# Patient Record
Sex: FEMALE | Race: WHITE | NOT HISPANIC OR LATINO | Employment: FULL TIME | URBAN - METROPOLITAN AREA
[De-identification: names, ages, dates, MRNs, and addresses within clinical notes are randomized per-mention and may not be internally consistent; named-entity substitution may affect disease eponyms.]

---

## 2017-02-03 ENCOUNTER — OFFICE VISIT - HEALTHEAST (OUTPATIENT)
Dept: FAMILY MEDICINE | Facility: CLINIC | Age: 32
End: 2017-02-03

## 2017-02-03 DIAGNOSIS — Z00.00 ROUTINE ADULT HEALTH MAINTENANCE: ICD-10-CM

## 2017-02-03 DIAGNOSIS — Z30.44 ENCOUNTER FOR SURVEILLANCE OF VAGINAL RING HORMONAL CONTRACEPTIVE DEVICE: ICD-10-CM

## 2017-02-03 DIAGNOSIS — Z13.6 SCREENING FOR CARDIOVASCULAR CONDITION: ICD-10-CM

## 2017-02-03 DIAGNOSIS — N89.8 VAGINAL DISCHARGE: ICD-10-CM

## 2017-02-03 DIAGNOSIS — G47.00 INSOMNIA, UNSPECIFIED: ICD-10-CM

## 2017-02-03 DIAGNOSIS — F41.9 ANXIETY: ICD-10-CM

## 2017-02-03 DIAGNOSIS — Z13.1 SCREENING FOR DIABETES MELLITUS: ICD-10-CM

## 2017-02-03 DIAGNOSIS — Z12.4 PAP SMEAR FOR CERVICAL CANCER SCREENING: ICD-10-CM

## 2017-02-03 LAB
CHOLEST SERPL-MCNC: 210 MG/DL
FASTING STATUS PATIENT QL REPORTED: YES
HDLC SERPL-MCNC: 43 MG/DL
LDLC SERPL CALC-MCNC: 117 MG/DL
TRIGL SERPL-MCNC: 251 MG/DL

## 2017-02-03 ASSESSMENT — MIFFLIN-ST. JEOR: SCORE: 1566.61

## 2017-02-09 LAB
HPV INTERPRETATION - HISTORICAL: NORMAL
HPV INTERPRETER - HISTORICAL: NORMAL

## 2017-02-13 LAB
BKR LAB AP ABNORMAL BLEEDING: NO
BKR LAB AP BIRTH CONTROL/HORMONES: NORMAL
BKR LAB AP CERVICAL APPEARANCE: NORMAL
BKR LAB AP GYN ADEQUACY: NORMAL
BKR LAB AP GYN INTERPRETATION: NORMAL
BKR LAB AP HPV REFLEX: NORMAL
BKR LAB AP LMP: NORMAL
BKR LAB AP PATIENT STATUS: NORMAL
BKR LAB AP PREVIOUS ABNORMAL: NORMAL
BKR LAB AP PREVIOUS NORMAL: 2013
HIGH RISK?: NO
PATH REPORT.COMMENTS IMP SPEC: NORMAL
RESULT FLAG (HE HISTORICAL CONVERSION): NORMAL

## 2017-02-28 ENCOUNTER — COMMUNICATION - HEALTHEAST (OUTPATIENT)
Dept: SCHEDULING | Facility: CLINIC | Age: 32
End: 2017-02-28

## 2017-09-18 ENCOUNTER — OFFICE VISIT - HEALTHEAST (OUTPATIENT)
Dept: FAMILY MEDICINE | Facility: CLINIC | Age: 32
End: 2017-09-18

## 2017-09-18 DIAGNOSIS — M79.662 PAIN OF LEFT CALF: ICD-10-CM

## 2017-09-18 ASSESSMENT — MIFFLIN-ST. JEOR: SCORE: 1584.91

## 2017-10-16 ENCOUNTER — COMMUNICATION - HEALTHEAST (OUTPATIENT)
Dept: FAMILY MEDICINE | Facility: CLINIC | Age: 32
End: 2017-10-16

## 2017-11-01 ENCOUNTER — COMMUNICATION - HEALTHEAST (OUTPATIENT)
Dept: FAMILY MEDICINE | Facility: CLINIC | Age: 32
End: 2017-11-01

## 2018-02-23 ENCOUNTER — COMMUNICATION - HEALTHEAST (OUTPATIENT)
Dept: FAMILY MEDICINE | Facility: CLINIC | Age: 33
End: 2018-02-23

## 2018-02-23 DIAGNOSIS — Z30.44 ENCOUNTER FOR SURVEILLANCE OF VAGINAL RING HORMONAL CONTRACEPTIVE DEVICE: ICD-10-CM

## 2018-05-22 ENCOUNTER — COMMUNICATION - HEALTHEAST (OUTPATIENT)
Dept: FAMILY MEDICINE | Facility: CLINIC | Age: 33
End: 2018-05-22

## 2018-05-22 DIAGNOSIS — Z30.44 ENCOUNTER FOR SURVEILLANCE OF VAGINAL RING HORMONAL CONTRACEPTIVE DEVICE: ICD-10-CM

## 2018-11-04 ENCOUNTER — COMMUNICATION - HEALTHEAST (OUTPATIENT)
Dept: FAMILY MEDICINE | Facility: CLINIC | Age: 33
End: 2018-11-04

## 2018-11-04 DIAGNOSIS — Z30.44 ENCOUNTER FOR SURVEILLANCE OF VAGINAL RING HORMONAL CONTRACEPTIVE DEVICE: ICD-10-CM

## 2018-11-05 ENCOUNTER — COMMUNICATION - HEALTHEAST (OUTPATIENT)
Dept: FAMILY MEDICINE | Facility: CLINIC | Age: 33
End: 2018-11-05

## 2018-11-05 DIAGNOSIS — Z30.44 ENCOUNTER FOR SURVEILLANCE OF VAGINAL RING HORMONAL CONTRACEPTIVE DEVICE: ICD-10-CM

## 2018-11-12 ENCOUNTER — OFFICE VISIT - HEALTHEAST (OUTPATIENT)
Dept: FAMILY MEDICINE | Facility: CLINIC | Age: 33
End: 2018-11-12

## 2018-11-12 DIAGNOSIS — J02.0 ACUTE STREPTOCOCCAL PHARYNGITIS: ICD-10-CM

## 2018-12-03 ENCOUNTER — COMMUNICATION - HEALTHEAST (OUTPATIENT)
Dept: FAMILY MEDICINE | Facility: CLINIC | Age: 33
End: 2018-12-03

## 2018-12-03 DIAGNOSIS — Z30.44 ENCOUNTER FOR SURVEILLANCE OF VAGINAL RING HORMONAL CONTRACEPTIVE DEVICE: ICD-10-CM

## 2019-02-25 ENCOUNTER — COMMUNICATION - HEALTHEAST (OUTPATIENT)
Dept: FAMILY MEDICINE | Facility: CLINIC | Age: 34
End: 2019-02-25

## 2019-02-25 DIAGNOSIS — Z30.44 ENCOUNTER FOR SURVEILLANCE OF VAGINAL RING HORMONAL CONTRACEPTIVE DEVICE: ICD-10-CM

## 2019-03-01 ENCOUNTER — OFFICE VISIT - HEALTHEAST (OUTPATIENT)
Dept: FAMILY MEDICINE | Facility: CLINIC | Age: 34
End: 2019-03-01

## 2019-03-01 DIAGNOSIS — K21.9 GASTROESOPHAGEAL REFLUX DISEASE WITHOUT ESOPHAGITIS: ICD-10-CM

## 2019-03-01 DIAGNOSIS — R13.10 DYSPHAGIA, UNSPECIFIED TYPE: ICD-10-CM

## 2019-03-01 ASSESSMENT — MIFFLIN-ST. JEOR: SCORE: 1573.12

## 2019-03-21 ENCOUNTER — OFFICE VISIT - HEALTHEAST (OUTPATIENT)
Dept: FAMILY MEDICINE | Facility: CLINIC | Age: 34
End: 2019-03-21

## 2019-03-21 DIAGNOSIS — Z12.4 SCREENING FOR MALIGNANT NEOPLASM OF CERVIX: ICD-10-CM

## 2019-03-21 DIAGNOSIS — N93.0 POSTCOITAL BLEEDING: ICD-10-CM

## 2019-03-21 DIAGNOSIS — N87.1 MODERATE CERVICAL DYSPLASIA, HISTOLOGICALLY CONFIRMED: ICD-10-CM

## 2019-03-21 ASSESSMENT — MIFFLIN-ST. JEOR: SCORE: 1564.33

## 2019-03-22 ENCOUNTER — COMMUNICATION - HEALTHEAST (OUTPATIENT)
Dept: FAMILY MEDICINE | Facility: CLINIC | Age: 34
End: 2019-03-22

## 2019-03-22 LAB
HPV SOURCE: NORMAL
HUMAN PAPILLOMA VIRUS 16 DNA: NEGATIVE
HUMAN PAPILLOMA VIRUS 18 DNA: NEGATIVE
HUMAN PAPILLOMA VIRUS FINAL DIAGNOSIS: NORMAL
HUMAN PAPILLOMA VIRUS OTHER HR: NEGATIVE
SPECIMEN DESCRIPTION: NORMAL

## 2019-07-07 ENCOUNTER — COMMUNICATION - HEALTHEAST (OUTPATIENT)
Dept: FAMILY MEDICINE | Facility: CLINIC | Age: 34
End: 2019-07-07

## 2019-07-07 DIAGNOSIS — K21.9 GASTROESOPHAGEAL REFLUX DISEASE WITHOUT ESOPHAGITIS: ICD-10-CM

## 2019-07-07 DIAGNOSIS — R13.10 DYSPHAGIA, UNSPECIFIED TYPE: ICD-10-CM

## 2019-08-12 ENCOUNTER — COMMUNICATION - HEALTHEAST (OUTPATIENT)
Dept: FAMILY MEDICINE | Facility: CLINIC | Age: 34
End: 2019-08-12

## 2019-08-26 ENCOUNTER — HOSPITAL ENCOUNTER (OUTPATIENT)
Dept: CT IMAGING | Facility: CLINIC | Age: 34
Discharge: HOME | End: 2019-08-26
Attending: FAMILY MEDICINE

## 2019-08-26 ENCOUNTER — OFFICE VISIT - HEALTHEAST (OUTPATIENT)
Dept: FAMILY MEDICINE | Facility: CLINIC | Age: 34
End: 2019-08-26

## 2019-08-26 ENCOUNTER — RECORDS - HEALTHEAST (OUTPATIENT)
Dept: GENERAL RADIOLOGY | Facility: CLINIC | Age: 34
End: 2019-08-26

## 2019-08-26 DIAGNOSIS — J18.9 PNEUMONIA, UNSPECIFIED ORGANISM: ICD-10-CM

## 2019-08-26 DIAGNOSIS — J18.9 COMMUNITY ACQUIRED PNEUMONIA, UNSPECIFIED LATERALITY: ICD-10-CM

## 2019-08-27 ENCOUNTER — COMMUNICATION - HEALTHEAST (OUTPATIENT)
Dept: FAMILY MEDICINE | Facility: CLINIC | Age: 34
End: 2019-08-27

## 2019-08-28 ENCOUNTER — AMBULATORY - HEALTHEAST (OUTPATIENT)
Dept: FAMILY MEDICINE | Facility: CLINIC | Age: 34
End: 2019-08-28

## 2019-08-28 ENCOUNTER — COMMUNICATION - HEALTHEAST (OUTPATIENT)
Dept: FAMILY MEDICINE | Facility: CLINIC | Age: 34
End: 2019-08-28

## 2019-08-28 DIAGNOSIS — J18.9 COMMUNITY ACQUIRED PNEUMONIA, UNSPECIFIED LATERALITY: ICD-10-CM

## 2019-09-03 ENCOUNTER — OFFICE VISIT - HEALTHEAST (OUTPATIENT)
Dept: FAMILY MEDICINE | Facility: CLINIC | Age: 34
End: 2019-09-03

## 2019-09-03 DIAGNOSIS — J18.9 COMMUNITY ACQUIRED PNEUMONIA, UNSPECIFIED LATERALITY: ICD-10-CM

## 2019-11-14 ENCOUNTER — OFFICE VISIT - HEALTHEAST (OUTPATIENT)
Dept: FAMILY MEDICINE | Facility: CLINIC | Age: 34
End: 2019-11-14

## 2019-11-14 ENCOUNTER — COMMUNICATION - HEALTHEAST (OUTPATIENT)
Dept: SCHEDULING | Facility: CLINIC | Age: 34
End: 2019-11-14

## 2019-11-14 DIAGNOSIS — J06.9 UPPER RESPIRATORY TRACT INFECTION, UNSPECIFIED TYPE: ICD-10-CM

## 2020-04-23 ENCOUNTER — COMMUNICATION - HEALTHEAST (OUTPATIENT)
Dept: FAMILY MEDICINE | Facility: CLINIC | Age: 35
End: 2020-04-23

## 2020-06-23 ENCOUNTER — OFFICE VISIT - HEALTHEAST (OUTPATIENT)
Dept: FAMILY MEDICINE | Facility: CLINIC | Age: 35
End: 2020-06-23

## 2020-06-23 DIAGNOSIS — N93.0 PCB (POST COITAL BLEEDING): ICD-10-CM

## 2020-06-23 DIAGNOSIS — Z87.01 HISTORY OF PNEUMONIA: ICD-10-CM

## 2020-06-23 DIAGNOSIS — N92.6 IRREGULAR MENSTRUAL CYCLE: ICD-10-CM

## 2020-06-30 ENCOUNTER — AMBULATORY - HEALTHEAST (OUTPATIENT)
Dept: LAB | Facility: CLINIC | Age: 35
End: 2020-06-30

## 2020-06-30 ENCOUNTER — AMBULATORY - HEALTHEAST (OUTPATIENT)
Dept: OTHER | Facility: CLINIC | Age: 35
End: 2020-06-30

## 2020-06-30 DIAGNOSIS — N92.6 IRREGULAR MENSTRUAL CYCLE: ICD-10-CM

## 2020-06-30 DIAGNOSIS — Z87.01 HISTORY OF PNEUMONIA: ICD-10-CM

## 2020-06-30 LAB
ESTRADIOL SERPL-MCNC: 49 PG/ML
FSH SERPL-ACNC: 6.3 MIU/ML
LH SERPL-ACNC: 9.8 MIU/ML
TSH SERPL DL<=0.005 MIU/L-ACNC: 1.85 UIU/ML (ref 0.3–5)

## 2020-07-01 LAB — COVID-19 ANTIBODY IGG: NEGATIVE

## 2020-07-03 ENCOUNTER — OFFICE VISIT - HEALTHEAST (OUTPATIENT)
Dept: FAMILY MEDICINE | Facility: CLINIC | Age: 35
End: 2020-07-03

## 2020-07-03 DIAGNOSIS — Z12.4 SCREENING FOR MALIGNANT NEOPLASM OF CERVIX: ICD-10-CM

## 2020-07-03 DIAGNOSIS — N89.8 VAGINAL DISCHARGE: ICD-10-CM

## 2020-07-03 LAB
CLUE CELLS: NORMAL
TRICHOMONAS, WET PREP: NORMAL
YEAST, WET PREP: NORMAL

## 2020-07-10 LAB
BKR LAB AP ABNORMAL BLEEDING: YES
BKR LAB AP BIRTH CONTROL/HORMONES: NORMAL
BKR LAB AP CERVICAL APPEARANCE: NORMAL
BKR LAB AP GYN ADEQUACY: NORMAL
BKR LAB AP GYN INTERPRETATION: NORMAL
BKR LAB AP HPV REFLEX: NORMAL
BKR LAB AP LMP: NORMAL
BKR LAB AP PATIENT STATUS: NORMAL
BKR LAB AP PREVIOUS ABNORMAL: NORMAL
BKR LAB AP PREVIOUS NORMAL: 2019
HIGH RISK?: NO
PATH REPORT.COMMENTS IMP SPEC: NORMAL
RESULT FLAG (HE HISTORICAL CONVERSION): NORMAL

## 2020-09-28 ENCOUNTER — COMMUNICATION - HEALTHEAST (OUTPATIENT)
Dept: FAMILY MEDICINE | Facility: CLINIC | Age: 35
End: 2020-09-28

## 2020-09-28 DIAGNOSIS — K21.9 GASTROESOPHAGEAL REFLUX DISEASE WITHOUT ESOPHAGITIS: ICD-10-CM

## 2020-09-28 DIAGNOSIS — R13.10 DYSPHAGIA, UNSPECIFIED TYPE: ICD-10-CM

## 2020-10-29 ENCOUNTER — COMMUNICATION - HEALTHEAST (OUTPATIENT)
Dept: FAMILY MEDICINE | Facility: CLINIC | Age: 35
End: 2020-10-29

## 2020-11-02 ENCOUNTER — OFFICE VISIT - HEALTHEAST (OUTPATIENT)
Dept: FAMILY MEDICINE | Facility: CLINIC | Age: 35
End: 2020-11-02

## 2020-11-02 DIAGNOSIS — N92.0 MENORRHAGIA WITH REGULAR CYCLE: ICD-10-CM

## 2020-11-02 DIAGNOSIS — N93.0 PCB (POST COITAL BLEEDING): ICD-10-CM

## 2020-11-02 ASSESSMENT — MIFFLIN-ST. JEOR: SCORE: 1566.03

## 2020-11-13 ENCOUNTER — RECORDS - HEALTHEAST (OUTPATIENT)
Dept: ADMINISTRATIVE | Facility: OTHER | Age: 35
End: 2020-11-13

## 2020-11-30 ENCOUNTER — OFFICE VISIT - HEALTHEAST (OUTPATIENT)
Dept: FAMILY MEDICINE | Facility: CLINIC | Age: 35
End: 2020-11-30

## 2020-11-30 ENCOUNTER — COMMUNICATION - HEALTHEAST (OUTPATIENT)
Dept: FAMILY MEDICINE | Facility: CLINIC | Age: 35
End: 2020-11-30

## 2020-11-30 ENCOUNTER — AMBULATORY - HEALTHEAST (OUTPATIENT)
Dept: FAMILY MEDICINE | Facility: CLINIC | Age: 35
End: 2020-11-30

## 2020-11-30 DIAGNOSIS — R05.9 COUGH: ICD-10-CM

## 2020-11-30 DIAGNOSIS — R50.9 FEVER, UNSPECIFIED FEVER CAUSE: ICD-10-CM

## 2020-11-30 DIAGNOSIS — Z20.822 ENCOUNTER FOR LABORATORY TESTING FOR COVID-19 VIRUS: ICD-10-CM

## 2020-11-30 DIAGNOSIS — M54.2 NECK PAIN: ICD-10-CM

## 2020-12-02 ENCOUNTER — COMMUNICATION - HEALTHEAST (OUTPATIENT)
Dept: SCHEDULING | Facility: CLINIC | Age: 35
End: 2020-12-02

## 2021-05-30 VITALS — BODY MASS INDEX: 32.17 KG/M2 | WEIGHT: 193.06 LBS | HEIGHT: 65 IN

## 2021-05-30 NOTE — TELEPHONE ENCOUNTER
Refill Approved    Rx renewed per Medication Renewal Policy. Medication was last renewed on 3/1/2019.    [facility], [facility] Triage/Med Refill 7/7/2019     Requested Prescriptions   Pending Prescriptions Disp Refills     omeprazole (PRILOSEC) 20 MG capsule [Pharmacy Med Name: OMEPRAZOLE DR 20 MG CAPSULE] 90 capsule 1     Sig: TAKE 1 CAPSULE (20 MG TOTAL) BY MOUTH DAILY BEFORE BREAKFAST.       GI Medications Refill Protocol Passed - 7/7/2019  8:46 AM        Passed - PCP or prescribing provider visit in last 12 or next 3 months.     Last office visit with prescriber/PCP: 3/21/2019 Debora Matthews MD OR same dept: 3/21/2019 Debora Matthews MD OR same specialty: 3/21/2019 Debora Matthews MD  Last physical: 2/3/2017 Last MTM visit: Visit date not found   Next visit within 3 mo: Visit date not found  Next physical within 3 mo: Visit date not found  Prescriber OR PCP: Debora Matthews MD  Last diagnosis associated with med order: 1. Dysphagia, unspecified type  - omeprazole (PRILOSEC) 20 MG capsule [Pharmacy Med Name: OMEPRAZOLE DR 20 MG CAPSULE]; Take 1 capsule (20 mg total) by mouth daily before breakfast.  Dispense: 90 capsule; Refill: 1    2. Gastroesophageal reflux disease without esophagitis  - omeprazole (PRILOSEC) 20 MG capsule [Pharmacy Med Name: OMEPRAZOLE DR 20 MG CAPSULE]; Take 1 capsule (20 mg total) by mouth daily before breakfast.  Dispense: 90 capsule; Refill: 1    If protocol passes may refill for 12 months if within 3 months of last provider visit (or a total of 15 months).

## 2021-05-31 VITALS — HEIGHT: 66 IN | WEIGHT: 193.6 LBS | BODY MASS INDEX: 31.12 KG/M2

## 2021-05-31 NOTE — PROGRESS NOTES
OV   9/3/2019  Assessment:     1. Community acquired pneumonia, unspecified laterality  XR Chest 2 Views        Plan:        Hospital studies were personally reviewed. We reviewed indications for re-evaluation and I have placed an order for a follow up CXR after 6-8 wks to assure resolution of the infiltrates. We reviewed use of OTC analgesics, decongestants, nasal steroids, and/or mucinex, as well as increased fluids, rest and humidity, etc. She will call or return to clinic with any ongoing or worsening symptoms.         Subjective:             Angie López presents for follow up of pneumonia diagnosed last week by CXR and confirmed by CT. She had right upper lobe, right lower lobe and small left upper lobe opacities. She had been sick for about a week prior to her visit with fever, chills and body aches, when the cough developed. She still has some wheezing and a rattly cough. She has completed azithromycin  And her cough is less frequent. She had some  joint issues with levaquin and got nervous since she has been weight lifting competitively.         The following portions of the patient's history were reviewed and updated as appropriate: allergies, current medications, past family history, past medical history, past social history, past surgical history and problem list.    Review of Systems  12 point ROS negative except as noted above.           Objective:        Vitals:    09/03/19 0939   BP: 120/86   Patient Position: Sitting   Cuff Size: Adult Regular   Pulse: 65   SpO2: 97%   Weight: 190 lb 2 oz (86.2 kg)      General     Alert, cooperative, no distress   Head:    Normocephalic, without obvious abnormality, atraumatic   Eyes:    PERRL, conjunctiva/corneas clear, EOM's intact   Ears:    Normal TM's and external ear canals, both ears   Nose:   Nasal mucosa normal, no drainage, and no sinus tenderness   Throat:   Oropharynx is clear with moist mucous membranes     Neck:   Supple, no adenopathy    Lungs:      clear to auscultation bilaterally   Heart :    Regular rate and rhythm, no murmur, rub or gallop   Abdomen:     Soft, non-tender, no masses   Skin:   Skin color, texture, turgor normal, no rashes or lesions

## 2021-05-31 NOTE — PROGRESS NOTES
Chief Complaint   Patient presents with     Cough     Pt c/o coughing, cold sx, sinus, breathing, wheezy, rattly       HPI: Patient presents with cough and congestion for approximately 1 week in duration.  She also has mild sputum production as well as fever.    ROS: No vomiting no rashes    SH:    reports that she has been smoking cigarettes.  She has a 1.25 pack-year smoking history. She has never used smokeless tobacco. She reports that she drinks alcohol. She reports that she does not use drugs.      FH: The Patient's family history includes Alcohol abuse in her father and mother; Breast cancer in her maternal grandmother; Diabetes in her mother; Lung cancer in her father; Pancreatitis in her father; Peripheral vascular disease in her mother.     Meds:  Angie has a current medication list which includes the following prescription(s): etonogestrel-ethinyl estradiol, famotidine, omeprazole, and levofloxacin.    O:  /64   Pulse 90   Temp 98.3  F (36.8  C)   Resp 16   Wt 189 lb 9 oz (86 kg)   SpO2 97%   BMI 30.60 kg/m     Constitutional:    --Vitals as above  --No acute distress  Eyes-  --Sclera noninjected  --Lids and conjunctiva normal  ENT-  --TMs clear  --Sclera noninjected  --Pharynx not erythematous  Neck-  --Neck supple    Lymph-  --No cervical lymphadenopathy  Lungs-  --wheezing bilaterally  Heart-  --Regular rate and rhythm  Skin-  --Pink and dry    Chest x-ray- right middle lobe pneumonia noted  CT scan of the chest shows right middle lobe infiltrate    A/P:   1. Community acquired pneumonia, unspecified laterality  The patient has substantial committee acquired pneumonia.  Will use Levaquin 750 daily, increase fluids and rest in 1 week follow-up.  - XR Chest 2 Views; Future  - levoFLOXacin (LEVAQUIN) 750 MG tablet; Take 1 tablet (750 mg total) by mouth daily for 10 days.  Dispense: 10 tablet; Refill: 0

## 2021-05-31 NOTE — TELEPHONE ENCOUNTER
Tell pt that this medication is safe for joints and tendons and is a good medication.  I would recommend continuing.

## 2021-06-02 VITALS — BODY MASS INDEX: 31.23 KG/M2 | WEIGHT: 193.5 LBS

## 2021-06-02 VITALS — HEIGHT: 66 IN | WEIGHT: 189.06 LBS | BODY MASS INDEX: 30.39 KG/M2

## 2021-06-02 VITALS — BODY MASS INDEX: 30.7 KG/M2 | WEIGHT: 191 LBS | HEIGHT: 66 IN

## 2021-06-03 ENCOUNTER — RECORDS - HEALTHEAST (OUTPATIENT)
Dept: ADMINISTRATIVE | Facility: CLINIC | Age: 36
End: 2021-06-03

## 2021-06-03 VITALS
RESPIRATION RATE: 16 BRPM | SYSTOLIC BLOOD PRESSURE: 138 MMHG | BODY MASS INDEX: 31.96 KG/M2 | DIASTOLIC BLOOD PRESSURE: 80 MMHG | TEMPERATURE: 98.9 F | WEIGHT: 198 LBS | OXYGEN SATURATION: 97 % | HEART RATE: 110 BPM

## 2021-06-03 VITALS
HEART RATE: 65 BPM | WEIGHT: 190.13 LBS | OXYGEN SATURATION: 97 % | SYSTOLIC BLOOD PRESSURE: 120 MMHG | DIASTOLIC BLOOD PRESSURE: 86 MMHG | BODY MASS INDEX: 30.69 KG/M2

## 2021-06-03 VITALS — WEIGHT: 189.56 LBS | BODY MASS INDEX: 30.6 KG/M2

## 2021-06-03 NOTE — TELEPHONE ENCOUNTER
RN triage  Call from pt   Pt states she thinks she has sinus infection -- gets one every year at this time and feels like previous sinus infections  Sick since yesterday  Feels like she has a fever -- has not checked temperature   Has sore throat - and neck gland pain   Has R side facial pain   No nasal congestion -- sl cough   Breathing OK -- swallowing OK   Throat looks red   Reviewed home care advice   Per protocol = should be seen   Pt already has appt scheduled   Griselda Weaver RN [facility] RN triage      Reason for Disposition    Earache also present    Protocols used: SORE THROAT-A-OH

## 2021-06-03 NOTE — PROGRESS NOTES
Chief Complaint   Patient presents with     Sinus Problem     Pt c/o sinus pressure, mucous in back of throat, fever, cough she thinks is from drainage. Pt did take ibuprofen this morning.       HPI: Streak of recent pneumonia, as well as anxiety, presents today with 2-day history of fever, sore throat and nasal drainage of mild intensity.  Old records reviewed from 8/28/2019 shows that she had originally being prescribed Levaquin for pneumonia and felt that it caused musculoskeletal aches so she was changed to Zithromax.    ROS: No vomiting or diarrhea.  No new rashes.  No shortness of breath.    SH:    reports that she has been smoking cigarettes. She has a 1.25 pack-year smoking history. She has never used smokeless tobacco. She reports current alcohol use. She reports that she does not use drugs.      FH: The Patient's family history includes Alcohol abuse in her father and mother; Breast cancer in her maternal grandmother; Diabetes in her mother; Lung cancer in her father; Pancreatitis in her father; Peripheral vascular disease in her mother.     Meds:  Angie has a current medication list which includes the following prescription(s): etonogestrel-ethinyl estradiol, famotidine, omeprazole, and azithromycin.    O:  /80   Pulse (!) 110   Temp 98.9  F (37.2  C)   Resp 16   Wt 198 lb (89.8 kg)   SpO2 97%   BMI 31.96 kg/m     Constitutional:    --Vitals as above  --No acute distress  Eyes-  --Sclera noninjected  --Lids and conjunctiva normal  ENT-  --TMs clear  --Sclera noninjected  --Pharynx moderately erythematous with PND  Neck-  --Neck supple    Lymph-  --mild painful cervical lymphadenopathy  Lungs-  --Clear to Auscultation  Heart-  --Regular rate and rhythm  Skin-  --Pink and dry          A/P:   1. Upper respiratory tract infection, unspecified type  This complicated lady has an upper respiratory infection.  This is in the context of having had pneumonia earlier this year and not tolerating  Levaquin.  She also has allergies to penicillins.  Therefore, we will go with Zithromax, increase fluids and rest and return if not improving.  - azithromycin (ZITHROMAX Z-XOCHITL) 250 MG tablet; 2 tabs today then 1 daily x 4 days  Dispense: 6 tablet; Refill: 0

## 2021-06-04 VITALS
OXYGEN SATURATION: 98 % | HEART RATE: 87 BPM | SYSTOLIC BLOOD PRESSURE: 130 MMHG | DIASTOLIC BLOOD PRESSURE: 74 MMHG | BODY MASS INDEX: 32.63 KG/M2 | WEIGHT: 202.19 LBS

## 2021-06-04 VITALS
DIASTOLIC BLOOD PRESSURE: 76 MMHG | WEIGHT: 189.44 LBS | SYSTOLIC BLOOD PRESSURE: 136 MMHG | BODY MASS INDEX: 30.45 KG/M2 | OXYGEN SATURATION: 99 % | HEART RATE: 80 BPM | HEIGHT: 66 IN

## 2021-06-04 VITALS — BODY MASS INDEX: 32.28 KG/M2 | WEIGHT: 200 LBS

## 2021-06-08 NOTE — PROGRESS NOTES
Assessment & Plan:      1. Routine adult health maintenance    2. Pap smear for cervical cancer screening  - Gynecologic Cytology (PAP Smear)    3. Anxiety     We reviewed options for treatment and she is interested in something for intermittent, infrequent use. We will send Rx for lorazepam prn. We did review indications for daily medication and she will follow up with worseing symptoms.   - LORazepam (ATIVAN) 1 MG tablet; Take 1 tablet (1 mg total) by mouth every 8 (eight) hours as needed for anxiety.  Dispense: 15 tablet; Refill: 2    4. Insomnia  - LORazepam (ATIVAN) 1 MG tablet; Take 1 tablet (1 mg total) by mouth every 8 (eight) hours as needed for anxiety.  Dispense: 15 tablet; Refill: 2    5. Vaginal discharge  - Wet Prep, Vaginal    6. Screening for cardiovascular condition  - Lipid Cascade    7. Screening for diabetes mellitus  - Glucose    8. Encounter for surveillance of vaginal ring hormonal contraceptive device  - etonogestrel-ethinyl estradiol (NUVARING) 0.12-0.015 mg/24 hr vaginal ring; Insert one ring vaginally with next menstrual cycle, leave in place for 3 wks and remove ring for 1 wk, then insert next ring and repeat.  Dispense: 3 each; Refill: 4     Patient Counseling:    --Nutrition: Stressed importance of moderation in sodium/caffeine intake, saturated fat and cholesterol, caloric balance, sufficient intake of fresh fruits, vegetables, calcium, and iron.    --Discussed the importance of vitamin D and calcium supplementation/intake, and the risks and benefits of daily use of baby aspirin.   --Family planning:reviewed contraceptive options, supplementing with folate and DHA and review of prescription medications when considering pregnancy.   --Exercise: Stressed the importance of regular weight-bearing exercise    --Substance Abuse: limit alcohol use    --Injury prevention: Discussed safety belts, distracted driving, fire safety    --Dental health: Discussed importance of regular tooth  brushing, flossing, and dental visits.    --Discussed pap frequency and indications for stopping screening.   --Discussed risks and benefits of regular breast self exams and evaluation with any changes    --Immunizations reviewed        Discussed the patient's BMI with her.  The BMI is above average; BMI management plan is completed      The following high BMI interventions were performed this visit: encouragement to exercise and lifestyle education regarding diet     Subjective:        Angie López is a 31 y.o. female who presents for annual exam.   The patient reports no concerns.       Fasting today? Yes       Has the patient ever been transfused or tattooed?: yes.      Do you have pain that bothers you in your daily life? no       The patient reports that there is not domestic violence in her life.     Gynecologic History     LMP: Patient's last menstrual period was 2017.  Contraception: Nuvaring,  planning vasectomy  Last Pap: 2015. Results were: abnormal  Abnormal pap history? Yes, WILLY II, S/P LEEP 2016  Last mammogram: n/a  : 3  Para: 3003    Healthy Habits:   Regular Exercise: Yes  Sunscreen Use: Yes  Healthy Diet: Yes  Dental Visits Regularly: Yes  Seat Belt: Yes  Sexually active: Yes  Self Breast Exam Monthly:No  Colonoscopy: No  Lipid Profile: No  Glucose Screen: No  Prevention of Osteoporosis: No  Last Dexa: N/A  Guns at Home:  Yes  Guns Safety Locks:  Yes    There is no immunization history for the selected administration types on file for this patient.  Immunization status: due today, Refuses Immunization.      There is no immunization history for the selected administration types on file for this patient.  Immunization status: Probably due for Td. Refuses Immunization.    The following portions of the patient's history were reviewed and updated as appropriate: allergies, current medications, past family history, past medical history, past social history, past surgical  "history and problem list.    Review of Systems  A 12 point comprehensive review of systems was negative except as noted.    Energy up/down  Anxiety increased, not sleeping well during the week, worrying  Would like something to take intermittently  Some dumping at times since cholecystectomy, some chronic diarrhea    Objective:        Visit Vitals     /64     Pulse 64     Temp 98.1  F (36.7  C) (Oral)     Ht 5' 5\" (1.651 m)     Wt 193 lb 1 oz (87.6 kg)     LMP 01/26/2017     BMI 32.13 kg/m2     General: alert, pleasant, and no distress  Head: Normocephalic, without obvious abnormality, atraumatic  Eyes: conjunctivae and sclerae normal and pupils equal, round, reactive to   light and accomodation  Ears: normal TM's and external ear canals both ears  Nose: no discharge, no sinus tenderness  Throat: lips, mucosa, and tongue normal; teeth and gums normal  Neck: no adenopathy, supple, symmetrical, trachea midline and thyroid normal  Back: symmetric, ROM normal. No CVA tenderness.  Lungs: clear to auscultation bilaterally  Breasts: normal appearance, no masses or tenderness  Heart : regular rate and rhythm and no murmurs  Abdomen:  bowel sounds normal, no masses palpable and soft, non-tender  Pelvic: external genitalia normal,  vagina normal without discharge, cervix normal in appearance,   no adnexal masses or tenderness, no cervical motion tenderness, uterus normal size and consistency   Extremities: extremities normal, atraumatic, no cyanosis or edema  Pulses: 2+ and symmetric  Skin: Skin color, texture, turgor normal. No rashes or lesions  Lymph nodes: Cervical, supraclavicular, and axillary nodes normal.  Neurologic: Grossly normal              "

## 2021-06-09 NOTE — PROGRESS NOTES
Assessment & Plan:     1. Screening for malignant neoplasm of cervix  Gynecologic Cytology (PAP Smear)    HPV High Risk DNA Cervical   2. Vaginal discharge  Wet Prep, Vaginal        We reviewed the likely/potential etiology(ies) for her post-coital bleeding symptoms and we will check a pap with HPV today. Wet prep was also done and was negative for yeast or bacterial infection. I think the ectropion likely explains her bleeding, but will see what the pap shows. We reviewed indications for re-evaluation, and she will call or return to clinic with any ongoing or worsening symptoms. She will otherwise f/u in  6-12 mos for routine med check/evaluation.       Subjective:       Angie López is a 34 y.o. woman who comes in today for a  pap smear only. Her most recent annual exam was 2017 and her last pap was 3/2019 and showed no abnormalities. Previous abnormal Pap smears: yes - HGSIL in 12/2015, s/p LEEP 1/2016. Contraception: vasectomy        She reports that she started spotting on Monday, then stopped. Cycles are 36-54 days apart. She does have a history of polyps and wonders if there is a recurrence. She denies pelvic pain or cramping.     The following portions of the patient's history were reviewed and updated as appropriate: allergies, current medications and problem list    Review of Systems  Comprehensive ROS negative except as noted above.      Objective:        Vitals:    07/03/20 0850   BP: 130/74   Pulse: 87   SpO2: 98%   Weight: 202 lb 3 oz (91.7 kg)       Physical Exam:  General: Alert, cooperative, NAD   Eyes: Conjunctiva, lids clear, no drainage.    Nose: Clear without rhinorrhea.   Throat:  normal.   Neck:   Supple, no significant adenopathy  Lungs:  Normal respirations  Cardiac: RRR without murmur  Abdomen:   Soft, nontender, no masses palpable.   Pelvic: external genitalia normal,  vagina normal with moderate adherent discharge, cervix normal in appearance with an area of ectropion from 11-1:00  that is very friable, no cervical motion tenderness  Musculoskeletal:  Normal strength and tone  Skin:  No rash

## 2021-06-09 NOTE — PROGRESS NOTES
"Angie López is a 34 y.o. female who is being evaluated via a billable video visit.      The patient has been notified of following:     \"This video visit will be conducted via a call between you and your physician/provider. We have found that certain health care needs can be provided without the need for an in-person physical exam.  This service lets us provide the care you need with a video conversation.  If a prescription is necessary we can send it directly to your pharmacy.  If lab work is needed we can place an order for that and you can then stop by our lab to have the test done at a later time.    Video visits are billed at different rates depending on your insurance coverage. Please reach out to your insurance provider with any questions.    If during the course of the call the physician/provider feels a video visit is not appropriate, you will not be charged for this service.\"    Patient has given verbal consent to a Video visit? Yes    Will anyone else be joining your video visit? No        Video Start Time: 8:01 AM    Additional provider notes:         Angie López is a 34 y.o. female who was contacted for evaluation of irregular menstrual cycle. She has been off OCPs since last August and periods have been irregular, infrequent and very light. She notes some bleeding after intercourse for the last few mos as well. She denies significant cramping or pain.       She was quite ill back in November with cough and fever to 105. She was diagnosed with pneumonia in late August and those symptoms had resolved prior to this episode. She wonders if she should be tested for COVID antibodies considering the symptoms.       Objective:  GENERAL: Healthy, alert and no distress  EYES: Eyes grossly normal to inspection. No discharge or erythema, or obvious scleral/conjunctival abnormalities.  RESP: No audible wheeze, cough, or visible cyanosis.  No visible retractions or increased work of breathing.    NEURO: " Cranial nerves grossly intact. Mentation and speech appropriate for age.  PSYCH: Mentation appears normal, affect normal/bright, judgement and insight intact, normal speech and appearance well-groomed         Assessment and Plan:  1. Irregular menstrual cycle  Follicle Stimulating Hormone (FSH)    Luteinizing Hormone (LH)    Estradiol    Thyroid Cascade   2. PCB (post coital bleeding)     3. History of pneumonia  COVID-19 Virus (Coronavirus) Antibody Screen, IgG         We reviewed the likely/potential etiology(ies) for her irregular bleeding symptoms and we will check labs as noted and have her follow up for a quick pap in the near future. We reviewed treatment options including hormonal methods, and she will call or return to clinic if symptoms persist. She will otherwise f/u in  6-12 mos for routine med check/evaluation.     Video-Visit Details    Type of service:  Video Visit    Video End Time (time video stopped): 08:15  Originating Location (pt. Location): Home    Distant Location (provider location):  [facility]OB     Platform used for Video Visit: Tien Matthews MD

## 2021-06-11 NOTE — TELEPHONE ENCOUNTER
Refill Approved    Rx renewed per Medication Renewal Policy. Medication was last renewed on 07/07/2019.  Last office visit was 07/03/2020 with PCP.    Georgina Vang, [facility] Triage/Med Refill 9/30/2020     Requested Prescriptions   Pending Prescriptions Disp Refills     omeprazole (PRILOSEC) 20 MG capsule [Pharmacy Med Name: OMEPRAZOLE DR 20 MG CAPSULE] 90 capsule 2     Sig: TAKE 1 CAPSULE (20 MG TOTAL) BY MOUTH DAILY BEFORE BREAKFAST.       GI Medications Refill Protocol Passed - 9/28/2020 12:41 AM        Passed - PCP or prescribing provider visit in last 12 or next 3 months.     Last office visit with prescriber/PCP: 7/3/2020 Debora Matthews MD OR same dept: 7/3/2020 Debora Matthews MD OR same specialty: 7/3/2020 Debora Matthews MD  Last physical: 2/3/2017 Last MTM visit: Visit date not found   Next visit within 3 mo: Visit date not found  Next physical within 3 mo: Visit date not found  Prescriber OR PCP: Debora Matthews MD  Last diagnosis associated with med order: 1. Dysphagia, unspecified type  - omeprazole (PRILOSEC) 20 MG capsule [Pharmacy Med Name: OMEPRAZOLE DR 20 MG CAPSULE]; Take 1 capsule (20 mg total) by mouth daily before breakfast.  Dispense: 90 capsule; Refill: 2    2. Gastroesophageal reflux disease without esophagitis  - omeprazole (PRILOSEC) 20 MG capsule [Pharmacy Med Name: OMEPRAZOLE DR 20 MG CAPSULE]; Take 1 capsule (20 mg total) by mouth daily before breakfast.  Dispense: 90 capsule; Refill: 2    If protocol passes may refill for 12 months if within 3 months of last provider visit (or a total of 15 months).

## 2021-06-12 NOTE — PROGRESS NOTES
"   Assessment:         1. PCB (post coital bleeding)  Ambulatory referral to Obstetrics / Gynecology   2. Menorrhagia with regular cycle  Ambulatory referral to Obstetrics / Gynecology           Plan:       We reviewed the potential/likely etiology(ies) for her menstrual symptoms and we will refer to OB/Gyn to review treatment options. We reviewed indications for re-evaluation and she will call or return to clinic with any ongoing or worsening symptoms.       Subjective:               Angie López is a 35 y.o. woman who presents for abnormal menses. Patient's last menstrual period was 10/22/2020 (approximate).  Periods are regular, about 30-40 days, lasting several days. They are getting heavier over time. She has noted more brown spotting/clotting recently. Her biggest concern is postcoital bleeding. There is some discomfort with penetration as well. Her last pap was normal but there was moderate ectropion and friability.      The following portions of the patient's history were reviewed and updated as appropriate: allergies, current medications, past family history, past medical history, past social history, past surgical history and problem list.    Review of Systems  12 point ROS negative except as noted above.            Objective:      Physical Exam:  /76 (Patient Position: Sitting, Cuff Size: Adult Large)   Pulse 80   Ht 5' 6\" (1.676 m)   Wt 189 lb 7 oz (85.9 kg)   LMP 10/22/2020 (Approximate)   SpO2 99%   BMI 30.58 kg/m    GEN: Alert and oriented, NAD,  well nourished  SKIN:  Normal skin turgor, no lesions/rashes   HEENT: moist mucous membranes, no rhinorrhea.    NECK: Normal.  No adenopathy or thyromegaly.  CV: Regular rate and rhythm, no murmurs.   LUNGS: Clear to auscultation bilaterally.    ABDOMEN: Soft, non-tender, non-distended, no masses   PELVIC: Not examined    EXTREMITY: No edema, cyanosis  NEURO: Grossly normal.        "

## 2021-06-13 NOTE — PROGRESS NOTES
"Chief complaint: Left calf pain    HPI: The patient has been having problems with her left calf for weeks.  She decided to call today to get in and could not get into her primary clinic until the end of next week and wanted to be seen sooner than later.  She is trying to do some training just to become more physically fit.  She goes to the  at the gym twice a week.  She is trying to do some running with the goal of running 5K race, she is not interested in running a half marathon or marathon.  She describes the pain as being annoying and sometimes causing tingling down into her Achilles tendon on her left.  She has been told by her  that she has \"tight calves\" she is not quite sure what to do about that with her training or stretching.  She went to running store and got some shoes with better arch support but she has not used them yet until she got a \"checked out\".  She does not have swelling or redness and no obvious injury.  For a while she had a little bit of tenderness on her anterior shin but she has had shin splints in the past and this was not similar to that and that seems to be resolving.  She has not had her legs give out on her or had any buckling of her legs while she is walking or running.    Objective:/64 (Patient Site: Right Arm, Patient Position: Sitting, Cuff Size: Adult Regular)  Pulse 72  Ht 5' 6\" (1.676 m)  Wt 193 lb 9.6 oz (87.8 kg)  LMP 09/08/2017 (Exact Date)  Breastfeeding? No  BMI 31.25 kg/m2  She is in no acute distress.  I can appreciate that her gastrocnemius muscle is a little bit more tight on the left than on the right particularly distally where it forms of the tendinous insertions.  The Achilles tendon itself is not tender or swollen and the Achilles bursal sac is not inflamed either.  Her range of motion is full there is not anything else on the ankle that is concerning.  She does not really have tenderness to palpation along the shin on the left.  She does " not have calf swelling that is asymmetrical.    Assessment: Calf pain    Plan: I suggested 400 mg of ibuprofen 3 times a day with food, icing, and physical therapy to do some formal stretching and exercise and possibly some ultrasound to manage the tendinitis and she is comfortable with this plan and will follow-up as needed

## 2021-06-13 NOTE — TELEPHONE ENCOUNTER
Patient having a cough and fever and headache 99.6 is the fever patient would like to have a covid test and wondering if Dr Matthews can order one there are no openings today with anyone.

## 2021-06-13 NOTE — TELEPHONE ENCOUNTER
"Symptoms started yesterday 11/29/20. \"tickly cough\". Today, HA and neck stiffness. Virtual visit scheduled for 12/1/20  "

## 2021-06-13 NOTE — PROGRESS NOTES
"Angie López is a 35 y.o. female who is being evaluated via a billable video visit.      The patient has been notified of following:     \"This video visit will be conducted via a call between you and your physician/provider. We have found that certain health care needs can be provided without the need for an in-person physical exam.  This service lets us provide the care you need with a video conversation.  If a prescription is necessary we can send it directly to your pharmacy.  If lab work is needed we can place an order for that and you can then stop by our lab to have the test done at a later time.    Video visits are billed at different rates depending on your insurance coverage. Please reach out to your insurance provider with any questions.    If during the course of the call the physician/provider feels a video visit is not appropriate, you will not be charged for this service.\"    Patient has given verbal consent to a Video visit? Yes  How would you like to obtain your AVS? AVS Preference: MyChart.  If dropped by the video visit, the video invitation should be sent to: Other e-mail: MY Chart   Will anyone else be joining your video visit? No        Video Start Time: 1:17    Additional provider notes:     1. Cough       2. Neck pain       3. Fever, unspecified fever cause       4. Encounter for laboratory testing for COVID-19 virus  Discussed with the patient symptomatic measures expectations and reasons for follow-up    - Symptomatic COVID-19 Virus (CORONAVIRUS) PCR; Future  The patient does not have other risk factors for complications related to COVID-19    Later test results came back positive for COVID-19    Patient has been informed of test results    Concern for COVID-19  About how many days ago did these symptoms start? 1 day  Is this your first visit for this illness? Yes  In the 14 days before your symptoms started, have you had close contact with someone with COVID-19 (Coronavirus)? No.  Do you " have a fever or chills? Yes, I felt feverish or had chills  Are you having new or worsening difficulty breathing? No  Do you have new or worsening cough? Yes, it's a dry cough.   Have you had any new or unexplained body aches? YES  Have you experienced any of the following NEW symptoms?    Headache: YES    Sore throat: No    Loss of taste or smell: No    Chest pain: No    Diarrhea: No    Rash: No  What treatments have you tried?  Over-the-counter analgesics and antipyretics  Who do you live with?  Family  Are you, or a household member, a healthcare worker or a ? No  Do you live in a nursing home, group home, or shelter? No  Do you have a way to get food/medications if quarantined? Yes, I have a friend or family member who can help me.     Per the video visit the patient does not demonstrate any shortness of breath  She otherwise appears well and in no distress          Video-Visit Details    Type of service:  Video Visit    Video End Time (time video stopped): 1:26  Originating Location (pt. Location): Home    Distant Location (provider location):  [facility]     Platform used for Video Visit: Tien De La Paz MD

## 2021-06-21 NOTE — PROGRESS NOTES
"Chief Complaint   Patient presents with     Adenopathy     Pt c/o sinus drainage, glands are huge, throat is sore, it is hard to swallow x 3 days       HPI: Very pleasant 33-year-old female, with a history of allergic rhinitis and migraine complex headaches, recently completed a trip to [place] last week on an airplane, and has developed sore throat, \"swollen glands\" and mild fever.  This is been 3 days in duration of moderate intensity.    ROS: No vomiting or diarrhea or rashes    SH:    reports that she quit smoking about 5 years ago. Her smoking use included cigarettes. She has a 1.25 pack-year smoking history. she has never used smokeless tobacco. She reports that she drinks alcohol. She reports that she does not use drugs.      FH: The Patient's family history includes Alcohol abuse in her father and mother; Breast cancer in her maternal grandmother; Diabetes in her mother; Lung cancer in her father; Pancreatitis in her father; Peripheral vascular disease in her mother.     Meds:  Angie has a current medication list which includes the following prescription(s): azithromycin, etonogestrel-ethinyl estradiol, famotidine, and nuvaring.    O:  /68   Pulse 100   Temp 98.2  F (36.8  C)   Resp 16   Wt 193 lb 8 oz (87.8 kg)   SpO2 98%   BMI 31.23 kg/m     Constitutional:    --Vitals as above  --No acute distress  Eyes-  --Sclera noninjected  --Lids and conjunctiva normal  ENT-  --TMs clear  --Sclera noninjected  --Pharynx  erythematous  Neck-  --Neck supple    Lymph-  --moderate cervical lymphadenopathy  Lungs-  --Clear to Auscultation  Heart-  --Regular rate and rhythm  Skin-  --Pink and dry          A/P:   1. Acute streptococcal pharyngitis  We will treat with Zithromax, ibuprofen, increase fluids and rest.  - azithromycin (ZITHROMAX Z-XOCHITL) 250 MG tablet; 2 tabs today then 1 daily x 4 days.  Dispense: 6 tablet; Refill: 0                                          "

## 2021-06-24 NOTE — TELEPHONE ENCOUNTER
Refill Approved    Rx renewed per Medication Renewal Policy. Medication was last renewed on 12/5/18.    Janay Caceres, [facility] Triage/Med Refill 2/25/2019     Requested Prescriptions   Pending Prescriptions Disp Refills     etonogestrel-ethinyl estradiol (NUVARING) 0.12-0.015 mg/24 hr vaginal ring [Pharmacy Med Name: NUVARING VAGINAL RING]  0     Sig: INSERT 1 RING VAGINALLY AS DIRECTED. REMOVE AFTER 3 WEEKS & WAIT 7 DAYS BEFORE INSERTING A NEW RING    Oral Contraceptives Protocol Passed - 2/25/2019  1:24 AM       Passed - Visit with PCP or prescribing provider visit in last 12 months     Last office visit with prescriber/PCP: 11/12/2018 Sherry Gold MD OR same dept: 11/12/2018 Sherry Gold MD OR same specialty: 11/12/2018 Sherry Gold MD  Last physical: Visit date not found Last MTM visit: Visit date not found   Next visit within 3 mo: Visit date not found  Next physical within 3 mo: Visit date not found  Prescriber OR PCP: Sherry Gold MD  Last diagnosis associated with med order: 1. Encounter for surveillance of vaginal ring hormonal contraceptive device  - NUVARING 0.12-0.015 mg/24 hr vaginal ring [Pharmacy Med Name: NUVARING VAGINAL RING]; INSERT 1 RING VAGINALLY AS DIRECTED. REMOVE AFTER 3 WEEKS & WAIT 7 DAYS BEFORE INSERTING A NEW RING; Refill: 0    If protocol passes may refill for 12 months if within 3 months of last provider visit (or a total of 15 months).

## 2021-06-24 NOTE — PROGRESS NOTES
"     Assessment & Plan:     1. Dysphagia, unspecified type  omeprazole (PRILOSEC) 20 MG capsule   2. Gastroesophageal reflux disease without esophagitis  omeprazole (PRILOSEC) 20 MG capsule         We reviewed the potential etiologies for her GI symptoms and we will begin a trial of prilosec, and she can continue the pepcid at [facility] for now. If there is no change over the next few weeks, I would recommend proceeding with EGD or UGI study. We reviewed use of OTC analgesics as well as increased fluids and rest, and she will call or return to clinic with any ongoing or worsening symptoms.          Subjective:           Angie López is an 33 y.o. female who presents for evaluation of dysphagia and foreign body sensation in chest intermitently. She reports 2 severe episodes, once while eating chicken and once while eating pulled pork that was fairly dry. Both times she was very hungry. This has been associated with heartburn, nausea and waterbrash. She has had heartburn since her gallbladder surgery in 2016. She denies hematemesis, hoarseness, wheezing and vomiting. Symptoms have been present for a few months. She has had dysphagia for solids, but not liquids. She has not lost weight. She denies melena, hematochezia, hematemesis, and coffee ground emesis. Medical therapy in the past has included: H2 antagonists, but nothing regularly.       The following portions of the patient's history were reviewed and updated as appropriate: allergies, current medications, past family history, past medical history, past social history, past surgical history and problem list.    Review of Systems  A 12 point comprehensive review of systems was negative except as noted.       Objective:     Vitals:    03/01/19 1442   BP: 115/78   Pulse: 85   SpO2: 98%   Weight: 191 lb (86.6 kg)   Height: 5' 6\" (1.676 m)      Body mass index is 30.83 kg/m .   Physical Exam:  GEN: Alert and oriented, NAD,  well nourished  SKIN:  Normal skin turgor, no " lesions/rashes   HEENT: moist mucous membranes, no rhinorrhea.    NECK: Normal.  No adenopathy or thyromegaly.  CV: Regular rate and rhythm, no murmurs.   LUNGS: Clear to auscultation bilaterally.    ABDOMEN: Soft, non-tender, non-distended, no masses   EXTREMITY: No edema, cyanosis  NEURO: Grossly normal.

## 2021-06-25 NOTE — PROGRESS NOTES
"OV   3/21/2019  Assessment:      1. Postcoital bleeding     2. History Moderate cervical dysplasia, histologically confirmed  Gynecologic Cytology (PAP Smear)    HPV High Risk DNA Cervical   3. Screening for malignant neoplasm of cervix  Gynecologic Cytology (PAP Smear)    HPV High Risk DNA Cervical           Plan:         We reviewed the potential etiologies for her vaginal bleeding symptoms and pap was sent as noted above.  We reviewed options for treatment and elected to attempt cautery with silver nitrate as noted. We will proceed with furtehr evaluation of the cervix as indicated by the pap results. We reviewed contraceptive options and she is interested in stopping the Nuvaring if possible, but her  has not been seen for a vasectomy as they originally planned.       Subjective:             Angie López is a 33 y.o. female who presents for evaluation of an abnormal vaginal bleeding after intercourse. Symptoms have been present for 1 week. Vaginal symptoms: she has felt a nodularity on her cervix which is not painful. She denies discharge, dyspareunia, lesions and local irritation. Associated symptoms: none. Denies arthralgias, chills, dysuria, fever and myalgias.       Contraception: NuvaRing vaginal inserts. Sexually transmitted infection risk: very low risk of STD exposure. Menstrual flow: regular every 28-30 days. She was due today. She has a history of moderate dysplasia and is S/P LEEP in 2016. Her last pap was normal 2 yrs ago.     The following portions of the patient's history were reviewed and updated as appropriate: allergies, current medications and problem list    Review of Systems  Pertinent items are noted in HPI.     Objective:         Vitals:    03/21/19 0755   BP: 136/78   Pulse: 92   Weight: 189 lb 1 oz (85.8 kg)   Height: 5' 6\" (1.676 m)   LMP: 02/24/2019      Physical Exam:  General: Alert, cooperative, no distress  Head: Normocephalic, without obvious abnormality, " atraumatic  Eyes:  conjunctiva/corneas clear, EOM's intact  Throat: Lips, mucosa, and tongue normal; teeth and gums normal  Neck: Supple, symmetrical, trachea midline, no  Lungs: Clear to auscultation bilaterally, respirations unlabored  Heart: Regular rate and rhythm,  no murmur, rub, or gallop,   Abdomen: Soft, non-tender, no masses, no organomegaly  Pelvic: external genitalia normal,  vagina normal without discharge, cervix normal in appearance with ? granulation   tissue vs endocervical polyps noted in endocervix, no cervical motion tenderness.   Extremities: Extremities normal, atraumatic, no cyanosis or edema  Skin: Skin color, texture, turgor normal, no rashes or lesions  Neurologic: Normal

## 2021-06-26 ENCOUNTER — HEALTH MAINTENANCE LETTER (OUTPATIENT)
Age: 36
End: 2021-06-26

## 2021-08-12 DIAGNOSIS — F41.9 ANXIETY: ICD-10-CM

## 2021-08-15 RX ORDER — ESCITALOPRAM OXALATE 10 MG/1
10 TABLET ORAL DAILY
Qty: 90 TABLET | Refills: 2 | Status: SHIPPED | OUTPATIENT
Start: 2021-08-15 | End: 2022-02-10

## 2021-08-15 NOTE — TELEPHONE ENCOUNTER
"Last Written Prescription Date:  6/21/21  Last Fill Quantity: 6/21/21,  # refills: 60   Last office visit provider:  0     Requested Prescriptions   Pending Prescriptions Disp Refills     escitalopram (LEXAPRO) 10 MG tablet [Pharmacy Med Name: ESCITALOPRAM 10 MG TABLET] 60 tablet 0     Sig: TAKE 1 TABLET BY MOUTH EVERY DAY       SSRIs Protocol Passed - 8/12/2021  1:31 PM        Passed - Recent (12 mo) or future (30 days) visit within the authorizing provider's specialty     Patient has had an office visit with the authorizing provider or a provider within the authorizing providers department within the previous 12 mos or has a future within next 30 days. See \"Patient Info\" tab in inbasket, or \"Choose Columns\" in Meds & Orders section of the refill encounter.              Passed - Medication is active on med list        Passed - Patient is age 18 or older        Passed - No active pregnancy on record        Passed - No positive pregnancy test in last 12 months             tristan payne RN 08/15/21 5:52 AM  "

## 2021-10-16 ENCOUNTER — HEALTH MAINTENANCE LETTER (OUTPATIENT)
Age: 36
End: 2021-10-16

## 2022-02-10 ENCOUNTER — OFFICE VISIT (OUTPATIENT)
Dept: FAMILY MEDICINE | Facility: CLINIC | Age: 37
End: 2022-02-10
Payer: COMMERCIAL

## 2022-02-10 VITALS
DIASTOLIC BLOOD PRESSURE: 84 MMHG | BODY MASS INDEX: 33.82 KG/M2 | OXYGEN SATURATION: 98 % | HEIGHT: 65 IN | HEART RATE: 83 BPM | RESPIRATION RATE: 20 BRPM | TEMPERATURE: 98.7 F | SYSTOLIC BLOOD PRESSURE: 140 MMHG | WEIGHT: 203 LBS

## 2022-02-10 DIAGNOSIS — F90.9 ATTENTION DEFICIT HYPERACTIVITY DISORDER (ADHD), UNSPECIFIED ADHD TYPE: ICD-10-CM

## 2022-02-10 DIAGNOSIS — F99 MENTAL DISORDER: Primary | ICD-10-CM

## 2022-02-10 DIAGNOSIS — L65.9 LOSS OF HAIR: ICD-10-CM

## 2022-02-10 DIAGNOSIS — R03.0 ELEVATED BLOOD PRESSURE READING WITHOUT DIAGNOSIS OF HYPERTENSION: ICD-10-CM

## 2022-02-10 DIAGNOSIS — K21.9 GASTROESOPHAGEAL REFLUX DISEASE WITHOUT ESOPHAGITIS: ICD-10-CM

## 2022-02-10 PROCEDURE — 99214 OFFICE O/P EST MOD 30 MIN: CPT | Performed by: FAMILY MEDICINE

## 2022-02-10 ASSESSMENT — ANXIETY QUESTIONNAIRES
5. BEING SO RESTLESS THAT IT IS HARD TO SIT STILL: SEVERAL DAYS
4. TROUBLE RELAXING: SEVERAL DAYS
GAD7 TOTAL SCORE: 9
6. BECOMING EASILY ANNOYED OR IRRITABLE: SEVERAL DAYS
GAD7 TOTAL SCORE: 9
1. FEELING NERVOUS, ANXIOUS, OR ON EDGE: MORE THAN HALF THE DAYS
2. NOT BEING ABLE TO STOP OR CONTROL WORRYING: MORE THAN HALF THE DAYS
GAD7 TOTAL SCORE: 9
3. WORRYING TOO MUCH ABOUT DIFFERENT THINGS: MORE THAN HALF THE DAYS
7. FEELING AFRAID AS IF SOMETHING AWFUL MIGHT HAPPEN: NOT AT ALL
7. FEELING AFRAID AS IF SOMETHING AWFUL MIGHT HAPPEN: NOT AT ALL

## 2022-02-10 ASSESSMENT — MIFFLIN-ST. JEOR: SCORE: 1606.68

## 2022-02-10 NOTE — PROGRESS NOTES
Answers for HPI/ROS submitted by the patient on 2/10/2022  SALENA 7 TOTAL SCORE: 9  Depression/Anxiety: Anxiety  Anxiety since last: : medium  Other associated symotome: : Yes  Significant life event: : No  Anxious:: Yes  Current substance use:: No  How many servings of fruits and vegetables do you eat daily?: 2-3  On average, how many sweetened beverages do you drink each day (Examples: soda, juice, sweet tea, etc.  Do NOT count diet or artificially sweetened beverages)?: 1  How many minutes a day do you exercise enough to make your heart beat faster?: 30 to 60  How many days a week do you exercise enough to make your heart beat faster?: 4  How many days per week do you miss taking your medication?: 0          Assessment & Plan     (F99) Mental disorder  (primary encounter diagnosis)  Comment:  Mental disorder for years: difficulty to focus on work, anxious, stress etc,  was treated with medication as anxiety and did not help. DDX anxiety, add. Will have psychology evaluation and treatment.   Plan: Adult Mental Health  Referral             (F90.9) Attention deficit hyperactivity disorder (ADHD), unspecified ADHD type  Comment: pt has difficult to focus at work, she is able to finish her work but stress.  She had hard time to finish home work on time when she was at high school. No evaluation for ADHD. She was Dxed of adhd by pcp and treated with ritalin and she had side effect.   Plan: Adult Mental Health  Referral         Will have psychology adhd evaluation and therapy.     (L65.9) Loss of hair  Comment: she noticed of hair loss for several month after she had iud. She does hair treatment sometimes. No change of shampoo. No itching skin. No family history for bald.  Unlikely it is related to iud.   Plan: advise to avoid hair treatment. Advise to use dove shampoo.     (K21.9) Gastroesophageal reflux disease without esophagitis  Comment: chronic and stable condition. She took medication prn with  "help and no side effect.   Plan: omeprazole (PRILOSEC) 20 MG DR capsule        Advise avoid spicy, acid diet. Avoid late meal and avoid wear tight pants.     (R03.0) Elevated blood pressure reading without diagnosis of hypertension  Comment: bp high today at office. Pt state she is stress. History of gestational HTN but did not need medication.   Plan: strongly advise to monitor bp at home and follow-up in 1-2 weeks. If bp average > 13/90 she needs to take medication,    956}     BMI:   Estimated body mass index is 33.78 kg/m  as calculated from the following:    Height as of this encounter: 1.651 m (5' 5\").    Weight as of this encounter: 92.1 kg (203 lb).   Weight management plan: Discussed healthy diet and exercise guidelines     Return in about 2 weeks (around 2/24/2022).    Lelo Shelby MD  [facility] CRISTINA Adams is a 36 year old who presents for the following health issues     HPI     1) metal disorder,   Pt state she has hard time to focus on work and stress and anxious. . 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   She was treated as ADHD by her pcp 12 years ago with retalin but she had side effect of sleepy and stopped shortly.   She was treated as anxiety with lexpro 10 mg and she felt angry after taking medication and stopped after taking for 2 month.   She graduated from high school. She always finished her home work before deadline and had to push herself hard to finish home work.     2) elevated bp today and she had gestational  HTN but did not need medication.     3) GERD and took prilosec and helped. She has out of medication for several month and has acid reflux symptom. No weight loss, black stool. abd pain.     4) hair loss for several month since she has iud, and then  She had  covid infection. She dose hair treatment occasionally. No skin itching. No family history of bald.     Review of Systems   Constitutional, " "HEENT, cardiovascular, pulmonary, gi and gu systems are negative, except as otherwise noted.      Objective    BP (!) 140/84 (BP Location: Right arm, Patient Position: Sitting, Cuff Size: Adult Regular)   Pulse 83   Temp 98.7  F (37.1  C) (Oral)   Resp 20   Ht 1.651 m (5' 5\")   Wt 92.1 kg (203 lb)   LMP  (LMP Unknown)   SpO2 98%   Breastfeeding No   BMI 33.78 kg/m    Body mass index is 33.78 kg/m .  Physical Exam   GENERAL: healthy, alert and no distress  PSYCH: mentation appears normal, affect normal/bright            "

## 2022-02-10 NOTE — PATIENT INSTRUCTIONS
Patient Education     What Is ADHD?  Does your child have trouble sitting still or paying attention? You may have been told that ADHD (attention deficit hyperactivity disorder) may be the cause. A child with ADHD might have a hard time staying focused (attention deficit). He or she may also have trouble controlling impulses (hyperactivity disorder). A child with one or both of these problems struggles daily to perform and behave well. ADHD is no one s fault. But if left untreated, it can deprive a child of self-esteem, curb new relationships, and limit success.     Which of the following describe your child?  These are some of the symptoms of ADHD:  Attention deficit    Lacks mental focus    Performs inconsistently    Is distracted easily    Has trouble shifting between tasks or settings    Is messy, or loses things    Forgets    Hyperactive/impulsive    Has trouble controlling impulses (might talk too much, interrupt, or have a hard time taking turns)    Is easy to upset or anger    Is always moving (sometimes without purpose)    Does not learn from mistakes    What happens in the brain?  The brain controls your body, thoughts, and feelings. It does so with the help of neurotransmitters. These chemicals help the brain send and receive messages. With ADHD, the level of these chemicals often varies. This may cause signs of ADHD to come and go.   When messages are not received  With ADHD, chemicals in certain parts of the brain can be in short supply. Because of this, some messages do not travel between nerve cells. Messages that signal a person to control behavior or pay attention aren t passed along. As a result, traits common to ADHD may occur.   Remember your child s strengths  Children with ADHD can be challenging to raise. Because of this, it s easy to overlook their good traits. What s special about your child? Do your best to value and support your child s unique talents, strengths, and interests. To nurture  and support your child's self-esteem, share your positive thoughts and feelings with your child as often as possible.   [employer] last reviewed this educational content on 4/1/2020 2000-2021 [employer]. All rights reserved. This information is not intended as a substitute for professional medical care. Always follow your healthcare professional's instructions.

## 2022-02-11 ASSESSMENT — ANXIETY QUESTIONNAIRES: GAD7 TOTAL SCORE: 9

## 2022-02-16 ENCOUNTER — MYC MEDICAL ADVICE (OUTPATIENT)
Dept: FAMILY MEDICINE | Facility: CLINIC | Age: 37
End: 2022-02-16
Payer: COMMERCIAL

## 2022-02-16 DIAGNOSIS — G47.30 SLEEP APNEA, UNSPECIFIED TYPE: ICD-10-CM

## 2022-02-16 DIAGNOSIS — R06.83 HABITUAL SNORING: Primary | ICD-10-CM

## 2022-05-06 ENCOUNTER — VIRTUAL VISIT (OUTPATIENT)
Dept: SLEEP MEDICINE | Facility: CLINIC | Age: 37
End: 2022-05-06
Payer: COMMERCIAL

## 2022-05-06 VITALS — HEIGHT: 66 IN | BODY MASS INDEX: 33.11 KG/M2 | WEIGHT: 206 LBS

## 2022-05-06 DIAGNOSIS — E66.9 OBESITY (BMI 30-39.9): ICD-10-CM

## 2022-05-06 DIAGNOSIS — R06.89 GASPING FOR BREATH: ICD-10-CM

## 2022-05-06 DIAGNOSIS — R06.83 HABITUAL SNORING: ICD-10-CM

## 2022-05-06 DIAGNOSIS — G47.00 INSOMNIA, UNSPECIFIED TYPE: ICD-10-CM

## 2022-05-06 DIAGNOSIS — R29.818 SUSPECTED SLEEP APNEA: Primary | ICD-10-CM

## 2022-05-06 PROCEDURE — 99204 OFFICE O/P NEW MOD 45 MIN: CPT | Mod: GT | Performed by: PHYSICIAN ASSISTANT

## 2022-05-06 ASSESSMENT — SLEEP AND FATIGUE QUESTIONNAIRES
HOW LIKELY ARE YOU TO NOD OFF OR FALL ASLEEP WHILE SITTING QUIETLY AFTER LUNCH WITHOUT ALCOHOL: SLIGHT CHANCE OF DOZING
HOW LIKELY ARE YOU TO NOD OFF OR FALL ASLEEP WHILE SITTING INACTIVE IN A PUBLIC PLACE: WOULD NEVER DOZE
HOW LIKELY ARE YOU TO NOD OFF OR FALL ASLEEP WHEN YOU ARE A PASSENGER IN A CAR FOR AN HOUR WITHOUT A BREAK: SLIGHT CHANCE OF DOZING
HOW LIKELY ARE YOU TO NOD OFF OR FALL ASLEEP WHILE SITTING AND READING: SLIGHT CHANCE OF DOZING
HOW LIKELY ARE YOU TO NOD OFF OR FALL ASLEEP WHILE SITTING AND TALKING TO SOMEONE: WOULD NEVER DOZE
HOW LIKELY ARE YOU TO NOD OFF OR FALL ASLEEP WHILE LYING DOWN TO REST IN THE AFTERNOON WHEN CIRCUMSTANCES PERMIT: HIGH CHANCE OF DOZING
HOW LIKELY ARE YOU TO NOD OFF OR FALL ASLEEP WHILE WATCHING TV: SLIGHT CHANCE OF DOZING
HOW LIKELY ARE YOU TO NOD OFF OR FALL ASLEEP IN A CAR, WHILE STOPPED FOR A FEW MINUTES IN TRAFFIC: WOULD NEVER DOZE

## 2022-05-06 ASSESSMENT — PAIN SCALES - GENERAL: PAINLEVEL: NO PAIN (0)

## 2022-05-06 NOTE — PATIENT INSTRUCTIONS
"      MY TREATMENT INFORMATION FOR SLEEP APNEA-  Angie López    Am I having a sleep study at a sleep center?  --->Due to normal delays, you will be contacted within 2-4 weeks to schedule    Am I having a home sleep study?  --->Watch the video for the device you are using:    -/drop off device-   https://www.[employer].com/watch?v=yGGFBdELGhk    Frequently asked questions:  1. What is Obstructive Sleep Apnea (SUSAN)? SUSAN is the most common type of sleep apnea. Apnea means, \"without breath.\"  Apnea is most often caused by narrowing or collapse of the upper airway as muscles relax during sleep.   Almost everyone has occasional apneas. Most people with sleep apnea have had brief interruptions at night frequently for many years.  The severity of sleep apnea is related to how frequent and severe the events are.   2. What are the consequences of SUSAN? Symptoms include: feeling sleepy during the day, snoring loudly, gasping or stopping of breathing, trouble sleeping, and occasionally morning headaches or heartburn at night.  Sleepiness can be serious and even increase the risk of falling asleep while driving. Other health consequences may include development of high blood pressure and other cardiovascular disease in persons who are susceptible. Untreated SUSAN  can contribute to heart disease, stroke and diabetes.   3. What are the treatment options? In most situations, sleep apnea is a lifelong disease that must be managed with daily therapy. Medications are not effective for sleep apnea and surgery is generally not considered until other therapies have been tried. Your treatment is your choice . Continuous Positive Airway (CPAP) works right away and is the therapy that is effective in nearly everyone. An oral device to hold your jaw forward is usually the next most reliable option. Other options include postioning devices (to keep you off your back), weight loss, and surgery including a tongue pacing device. There is " more detail about some of these options below.  4. Are my sleep studies covered by insurance? Although we will request verification of coverage, we advise you also check in advance of the study to ensure there is coverage.    Important tips for those choosing CPAP and similar devices   Know your equipment:  CPAP is continuous positive airway pressure that prevents obstructive sleep apnea by keeping the throat from collapsing while you are sleeping. In most cases, the device is  smart  and can slowly self-adjusts if your throat collapses and keeps a record every day of how well you are treated-this information is available to you and your care team.  BPAP is bilevel positive airway pressure that keeps your throat open and also assists each breath with a pressure boost to maintain adequate breathing.  Special kinds of BPAP are used in patients who have inadequate breathing from lung or heart disease. In most cases, the device is  smart  and can slowly self-adjusts to assist breathing. Like CPAP, the device keeps a record of how well you are treated.  Your mask is your connection to the device. You get to choose what feels most comfortable and the staff will help to make sure if fits. Here: are some examples of the different masks that are available:       Key points to remember on your journey with sleep apnea:  Sleep study.  PAP devices often need to be adjusted during a sleep study to show that they are effective and adjusted right.  Good tips to remember: Try wearing just the mask during a quiet time during the day so your body adapts to wearing it. A humidifier is recommended for comfort in most cases to prevent drying of your nose and throat. Allergy medication from your provider may help you if you are having nasal congestion.  Getting settled-in. It takes more than one night for most of us to get used to wearing a mask. Try wearing just the mask during a quiet time during the day so your body adapts to wearing  it. A humidifier is recommended for comfort in most cases. Our team will work with you carefully on the first day and will be in contact within 4 days and again at 2 and 4 weeks for advice and remote device adjustments. Your therapy is evaluated by the device each day.   Use it every night. The more you are able to sleep naturally for 7-8 hours, the more likely you will have good sleep and to prevent health risks or symptoms from sleep apnea. Even if you use it 4 hours it helps. Occasionally all of us are unable to use a medical therapy, in sleep apnea, it is not dangerous to miss one night.   Communicate. Call our skilled team on the number provided on the first day if your visit for problems that make it difficult to wear the device. Over 2 out of 3 patients can learn to wear the device long-term with help from our team. Remember to call our team or your sleep providers if you are unable to wear the device as we may have other solutions for those who cannot adapt to mask CPAP therapy. It is recommended that you sleep your sleep provider within the first 3 months and yearly after that if you are not having problems.   Use it for your health. We encourage use of CPAP masks during daytime quiet periods to allow your face and brain to adapt to the sensation of CPAP so that it will be a more natural sensation to awaken to at night or during naps. This can be very useful during the first few weeks or months of adapting to CPAP though it does not help medically to wear CPAP during wakefulness and  should not be used as a strategy just to meet guidelines.  Take care of your equipment. Make sure you clean your mask and tubing using directions every day and that your filter and mask are replaced as recommended or if they are not working.     BESIDES CPAP, WHAT OTHER THERAPIES ARE THERE?    Positioning Device  Positioning devices are generally used when sleep apnea is mild and only occurs on your back.This example shows a  pillow that straps around the waist. It may be appropriate for those whose sleep study shows milder sleep apnea that occurs primarily when lying flat on one's back. Preliminary studies have shown benefit but effectiveness at home may need to be verified by a home sleep test. These devices are generally not covered by medical insurance.  Examples of devices that maintain sleeping on the back to prevent snoring and mild sleep apnea.    Belt type body positioner  http://[employer]/    Electronic reminder  http://nightshifttherapy.com/  [email or web address]/      Oral Appliance  What is oral appliance therapy?  An oral appliance device fits on your teeth at night like a retainer used after having braces. The device is made by a specialized dentist and requires several visits over 1-2 months before a manufactured device is made to fit your teeth and is adjusted to prevent your sleep apnea. Once an oral device is working properly, snoring should be improved. A home sleep test may be recommended at that time if to determine whether the sleep apnea is adequately treated.       Some things to remember:  -Oral devices are often, but not always, covered by your medical insurance. Be sure to check with your insurance provider.   -If you are referred for oral therapy, you will be given a list of specialized dentists to consider or you may choose to visit the Web site of the American Academy of Dental Sleep Medicine  -Oral devices are less likely to work if you have severe sleep apnea or are extremely overweight.     More detailed information  An oral appliance is a small acrylic device that fits over the upper and lower teeth  (similar to a retainer or a mouth guard). This device slightly moves jaw forward, which moves the base of the tongue forward, opens the airway, improves breathing for effective treat snoring and obstructive sleep apnea in perhaps 7 out of 10 people .  The best working devices are custom-made by a  dental device  after a mold is made of the teeth 1, 2, 3.  When is an oral appliance indicated?  Oral appliance therapy is recommended as a first-line treatment for patients with primary snoring, mild sleep apnea, and for patients with moderate sleep apnea who prefer appliance therapy to use of CPAP4, 5. Severity of sleep apnea is determined by sleep testing and is based on the number of respiratory events per hour of sleep.   How successful is oral appliance therapy?  The success rate of oral appliance therapy in patients with mild sleep apnea is 75-80% while in patients with moderate sleep apnea it is 50-70%. The chance of success in patients with severe sleep apnea is 40-50%. The research also shows that oral appliances have a beneficial effect on the cardiovascular health of SUSAN patients at the same magnitude as CPAP therapy7.  Oral appliances should be a second-line treatment in cases of severe sleep apnea, but if not completely successful then a combination therapy utilizing CPAP plus oral appliance therapy may be effective. Oral appliances tend to be effective in a broad range of patients although studies show that the patients who have the highest success are females, younger patients, those with milder disease, and less severe obesity. 3, 6.   Finding a dentist that practices dental sleep medicine  Specific training is available through the American Academy of Dental Sleep Medicine for dentists interested in working in the field of sleep. To find a dentist who is educated in the field of sleep and the use of oral appliances, near you, visit the Web site of the American Academy of Dental Sleep Medicine.    References  1. Angelina et al. Objectively measured vs self-reported compliance during oral appliance therapy for sleep-disordered breathing. Chest 2013; 144(5): 5426-7744.  2. Watson et al. Objective measurement of compliance during oral appliance therapy for sleep-disordered  breathing. Thorax 2013; 68(1): 91-96.  3. Sushil et al. Mandibular advancement devices in 620 men and women with SUSAN and snoring: tolerability and predictors of treatment success. Chest 2004; 125: 9849-1728.  4. Gonzalez et al. Oral appliances for snoring and SUSAN: a review. Sleep 2006; 29: 244-262.  5. Trish et al. Oral appliance treatment for SUSAN: an update. J Clin Sleep Med 2014; 10(2): 215-227.  6. Ely et al. Predictors of OSAH treatment outcome. J Dent Res 2007; 86: 2721-0493.      Weight Loss:    Weight loss is a long-term strategy that may improve sleep apnea in some patients.    Weight management is a personal decision and the decision should be based on your interest and the potential benefits.  If you are interested in exploring weight loss strategies, the following discussion covers the impact on weight loss on sleep apnea and the approaches that may be successful.    Being overweight does not necessarily mean you will have health consequences.  Those who have BMI over 35 or over 27 with existing medical conditions carries greater risk.   Weight loss decreases severity of sleep apnea in most people with obesity. For those with mild obesity who have developed snoring with weight gain, even 15-30 pound weight loss can improve and occasionally eliminate sleep apnea.  Structured and life-long dietary and health habits are necessary to lose weight and keep healthier weight levels.     Though there may be significant health benefits from weight loss, long-term weight loss is very difficult to achieve- studies show success with dietary management in less than 10% of people. In addition, substantial weight loss may require years of dietary control and may be difficult if patients have severe obesity. In these cases, surgical management may be considered.  Finally, older individuals who have tolerated obesity without health complications may be less likely to benefit from weight loss strategies.       [unfilled]    Surgery:    Surgery for obstructive sleep apnea is considered generally only when other therapies fail to work. Surgery may be discussed with you if you are having a difficult time tolerating CPAP and or when there is an abnormal structure that requires surgical correction.  Nose and throat surgeries often enlarge the airway to prevent collapse.  Most of these surgeries create pain for 1-2 weeks and up to half of the most common surgeries are not effective throughout life.  You should carefully discuss the benefits and drawbacks to surgery with your sleep provider and surgeon to determine if it is the best solution for you.   More information  Surgery for SUSAN is directed at areas that are responsible for narrowing or complete obstruction of the airway during sleep.  There are a wide range of procedures available to enlarge and/or stabilize the airway to prevent blockage of breathing in the three major areas where it can occur: the palate, tongue, and nasal regions.  Successful surgical treatment depends on the accurate identification of the factors responsible for obstructive sleep apnea in each person.  A personalized approach is required because there is no single treatment that works well for everyone.  Because of anatomic variation, consultation with an examination by a sleep surgeon is a critical first step in determining what surgical options are best for each patient.  In some cases, examination during sedation may be recommended in order to guide the selection of procedures.  Patients will be counseled about risks and benefits as well as the typical recovery course after surgery. Surgery is typically not a cure for a person s SUSAN.  However, surgery will often significantly improve one s SUSAN severity (termed  success rate ).  Even in the absence of a cure, surgery will decrease the cardiovascular risk associated with OSA7; improve overall quality of life8 (sleepiness, functionality, sleep  quality, etc).      Palate Procedures:  Patients with SUSAN often have narrowing of their airway in the region of their tonsils and uvula.  The goals of palate procedures are to widen the airway in this region as well as to help the tissues resist collapse.  Modern palate procedure techniques focus on tissue conservation and soft tissue rearrangement, rather than tissue removal.  Often the uvula is preserved in this procedure. Residual sleep apnea is common in patient after pharyngoplasty with an average reduction in sleep apnea events of 33%2.      Tongue Procedures:  ExamWhile patients are awake, the muscles that surround the throat are active and keep this region open for breathing. These muscles relax during sleep, allowing the tongue and other structures to collapse and block breathing.  There are several different tongue procedures available.  Selection of a tongue base procedure depends on characteristics seen on physical exam.  Generally, procedures are aimed at removing bulky tissues in this area or preventing the back of the tongue from falling back during sleep.  Success rates for tongue surgery range from 50-62%3.    Hypoglossal Nerve Stimulation:  Hypoglossal nerve stimulation has recently received approval from the [place] Food and Drug Administration for the treatment of obstructive sleep apnea.  This is based on research showing that the system was safe and effective in treating sleep apnea6.  Results showed that the median AHI score decreased 68%, from 29.3 to 9.0. This therapy uses an implant system that senses breathing patterns and delivers mild stimulation to airway muscles, which keeps the airway open during sleep.  The system consists of three fully implanted components: a small generator (similar in size to a pacemaker), a breathing sensor, and a stimulation lead.  Using a small handheld remote, a patient turns the therapy on before bed and off upon awakening.    Candidates for this  device must be greater than 22 years of age, have moderate to severe SUSAN (AHI between 20-65), BMI less than 32, have tried CPAP/oral appliance without success, and have appropriate upper airway anatomy (determined by a sleep endoscopy performed by Dr. Fregoso).    Hypoglossal Nerve Stimulation Pathway:    The sleep surgeon s office will work with the patient through the insurance prior-authorization process (including communications and appeals).    Nasal Procedures:  Nasal obstruction can interfere with nasal breathing during the day and night.  Studies have shown that relief of nasal obstruction can improve the ability of some patients to tolerate positive airway pressure therapy for obstructive sleep apnea1.  Treatment options include medications such as nasal saline, topical corticosteroid and antihistamine sprays, and oral medications such as antihistamines or decongestants. Non-surgical treatments can include external nasal dilators for selected patients. If these are not successful by themselves, surgery can improve the nasal airway either alone or in combination with these other options.      Combination Procedures:  Combination of surgical procedures and other treatments may be recommended, particularly if patients have more than one area of narrowing or persistent positional disease.  The success rate of combination surgery ranges from 66-80%2,3.    References  Sudhakar DECKER. The Role of the Nose in Snoring and Obstructive Sleep Apnoea: An Update.  Eur Arch Otorhinolaryngol. 2011; 268: 1365-73.   Shanice SM; Ru JA; Osmar JR; Pallanch JF; Arthur MB; Flash SG; Julio RYAN. Surgical modifications of the upper airway for obstructive sleep apnea in adults: a systematic review and meta-analysis. SLEEP 2010;33(10):6424-7968. Heidi MOURA. Hypopharyngeal surgery in obstructive sleep apnea: an evidence-based medicine review.  Arch Otolaryngol Head Neck Surg. 2006 Feb;132(2):206-13.  Eron YH1, Martinez Y, Niles AGUSTÍN. The  efficacy of anatomically based multilevel surgery for obstructive sleep apnea. Otolaryngol Head Neck Surg. 2003 Oct;129(4):327-35.  Heidi MOURA, Goldberg A. Hypopharyngeal Surgery in Obstructive Sleep Apnea: An Evidence-Based Medicine Review. Arch Otolaryngol Head Neck Surg. 2006 Feb;132(2):206-13.  Tiki BLANKENSHIP et al. Upper-Airway Stimulation for Obstructive Sleep Apnea.  N Engl J Med. 2014 Jan 9;370(2):139-49.  Qamar Y et al. Increased Incidence of Cardiovascular Disease in Middle-aged Men with Obstructive Sleep Apnea. Am J Respir Crit Care Med; 2002 166: 159-165  Banks EM et al. Studying Life Effects and Effectiveness of Palatopharyngoplasty (SLEEP) study: Subjective Outcomes of Isolated Uvulopalatopharyngoplasty. Otolaryngol Head Neck Surg. 2011; 144: 623-631.        WHAT IF I ONLY HAVE SNORING?    Mandibular advancement devices, lateral sleep positioning, long-term weight loss and treatment of nasal allergies have been shown to improve snoring.  Exercising tongue muscles with a game (https://www.ncbi.nlm.nih.gov/pubmed/41661136) or stimulating the tongue during the day with a device (https://doi.org/10.3390/rpf42849536) have improved snoring in some individuals.    Remember to Drive Safe... Drive Alive     Sleep health profoundly affects your health, mood, and your safety.  Thirty three percent of the population (one in three of us) is not getting enough sleep and many have a sleep disorder. Not getting enough sleep or having an untreated / undertreated sleep condition may make us sleepy without even knowing it. In fact, our driving could be dramatically impaired due to our sleep health. As your provider, here are some things I would like you to know about driving:     Here are some warning signs for impairment and dangerous drowsy driving:              -Having been awake more than 16 hours               -Looking tired               -Eyelid drooping              -Head nodding (it could be too late at this  point)              -Driving for more than 30 minutes     Some things you could do to make the driving safer if you are experiencing some drowsiness:              -Stop driving and rest              -Call for transportation              -Make sure your sleep disorder is adequately treated     Some things that have been shown NOT to work when experiencing drowsiness while driving:              -Turning on the radio              -Opening windows              -Eating any  distracting  /  entertaining  foods (e.g., sunflower seeds, candy, or any other)              -Talking on the phone      Your decision may not only impact your life, but also the life of others. Please, remember to drive safe for yourself and all of us.

## 2022-05-06 NOTE — PROGRESS NOTES
Angie is a 36 year old who is being evaluated via a billable video visit.      How would you like to obtain your AVS? MyChart  If the video visit is dropped, the invitation should be resent by: Send to e-mail at: [email or web address]  Will anyone else be joining your video visit? Mer[facility] Gasparflower Mcbride    Video-Visit Details    Type of service:  Video Visit    Video Start Time: 8:07AM    Video End Time:8:44AM    Originating Location (pt. Location): Home    Distant Location (provider location):  [facility]     Platform used for Video Visit: Well     [facility]   Outpatient Sleep Medicine Consultation  May 6, 2022       Name: Angie López MRN# [id number]   Age: 36 year old YOB: 1985     Date of Consultation: May 6, 2022   Consultation is requested by: Debora Matthews MD  [address] [place] [address] Debora Matthews  Primary care provider: Debora Matthews         Assessment and Plan:   1. Suspected sleep apnea  2. Habitual snoring  3. Gasping for breath in sleep   4. Insomnia, unspecified type  5. Obesity (BMI 30-39.9)    Patient is being evaluated for Obstructive Sleep Apnea (SUSAN) given report of loud habitual snoring and gasping arousals. We discussed pathophysiology of SUSAN and consequences of untreated moderate to severe sleep apnea such as a higher risk of hypertension, cardiovascular disease, cardiac arrhythmias, and stroke with worse outcomes after these events, as well as poor control of hypertension and diabetes. Patient is interested in treatment and willing to undergo overnight sleep testing.    Discussed testing with overnight attended polysomnography versus home sleep apnea testing. Patient concerned about falling asleep with us and question of childcare if  in lab testing. Thinks she will sleep better at home and would not have to worry about kids. Thus HST was ordered today however given relative low  "risk (STOPBANG 2) could consider PSG if HST unrevealing.     - HST-Home Sleep Apnea Test; Future    Briefly discussed potential treatment modalities (CPAP and MAD) in the event sleep apnea is identified on testing and additional information given in patient instructions. Will plan to discuss in more detail at next visit pending study results.     Discussed the importance of keeping a consistent sleep schedule on all days of the week and allowing for 7+ hours of sleep per night.    Follow up 1-2 weeks following her study for review of results and to expedite care. Educational materials provided in instructions.       Chief Complaint / Reason for Sleep Consult:     Chief Complaint   Patient presents with     Video Visit          History of Present Illness:     Angie López is a 36 year old female with allergic rhinitis, GERD, anxiety/ADHD who presents to the clinic for evaluation of possible sleep apnea. Reports her  has mentioned snoring in the past but did not realize it was a habitual problem until she got a [employer] smart watch that records her snoring at night and it is \"a lot\".  often leaves bedroom because of her snoring.   has never mentioned any witnessed apneic episodes to her however she does wake up gasping for air particularly when she first falls asleep. Sleeps on sides and back.  Symptoms are worse when supine.  No nocturia.  No nocturnal GERD, well controlled on PPI. No morning headaches.  No singificant morning dry mouth.     Admits that she does not keep a consistent sleep schedule. On weekdays, goes to bed somewhere between 10-12:00AM and wakes with alarm at 5:20AM, though may end up hitting snooze. On weekends, bedtime 12-1:00AM and wakes at 8-10:00AM. Falls asleep in ~30-45 minutes. Does admit to rumination at this time - \"my brain does not stop some nights like on a Sunday I think about the stuff I have at work\". Wakes 2-3 times per night to change positions vs snoring vs " "unsure, returns to sleep quickly.     Naps maybe once per week after work in afternoon for 30-60 minutes, \"it's hit or miss\" if wakes feeling better from nap. She had a total Eden Sleepiness Scale of 7 (22 0752), with scores of 10 or higher indicating abnormal levels of sleepiness. Denies any history of falling asleep or dozing while driving. No accidents or near accidents. Patient was counseled on the importance of driving while alert.    No other sleep concerns today. Patient denies typical restless legs syndrome symptoms. No kicking/leg movements in sleep. No dream enactment behavior. Rare somniloquy. No somnambulism. No frequent/recurring nightmares. No known bruxism.     SCALES       SLEEP APNEA: Stopbang score  2/8       INSOMNIA:  Insomnia severity score: 18/28   absence of insomnia (0-7); sub-threshold insomnia (8-14); moderate insomnia (15-21); and severe insomnia (22-28)       SLEEPINESS: Eden sleepiness scale: 7/24 [normal < 11]          Medications:     Current Outpatient Medications   Medication Sig     omeprazole (PRILOSEC) 20 MG DR capsule Take 1 capsule (20 mg) by mouth daily     No current facility-administered medications for this visit.             Allergies:     Allergies   Allergen Reactions     Penicillins Shortness Of Breath and Swelling     Throat swelling     Shellfish Derived [Shellfish-Derived Products] Swelling     Venom-Honey Bee [Bee Venom] Other (See Comments)     Feelings of swelling in the throat.      Cephalexin Rash            Problem List/Past Medical History:     Past Medical History:   Diagnosis Date     Anxiety      Cholecystitis      History of anesthesia complications     shivering when she had oral surgery but went well with the LEEP     Preeclampsia     In second pregnancy, HTN in third pregnancy     Pregnancy      - 's      Patient Active Problem List   Diagnosis     Allergic Rhinitis     Migraine          Past Surgical History:    Previous upper " "airway surgery: T&A in childhood   Past Surgical History:   Procedure Laterality Date     BIOPSY CERVICAL, LOCAL EXCISION, SINGLE/MULTIPLE       LAPAROSCOPIC CHOLECYSTECTOMY N/A 2016    Procedure: CHOLECYSTECTOMY LAPAROSCOPIC;  Surgeon: Eliu Avery MD;  Location: [facility];  Service:      TONSILLECTOMY & ADENOIDECTOMY  Age 2     WISDOM TOOTH EXTRACTION            Social History:     Social History     Tobacco Use     Smoking status: Former Smoker     Packs/day: 0.25     Years: 5.00     Pack years: 1.25     Types: Cigarettes     Quit date: 2013     Years since quittin.6     Smokeless tobacco: Former User     Quit date: 2021     Tobacco comment: vap   Substance Use Topics     Alcohol use: Yes     Comment: Alcoholic Drinks/day: A few drinks on weekends     Chemical History:  Alcohol use: 1 day per weekend    Tobacco use: Denies   Illicit substances: Mushrooms occasionally   Caffeine intake: Drinks preworkout or coffee in AM and occasional soda in afternoon         Family History:     Family History   Problem Relation Age of Onset     Diabetes Mother         Due to alcoholic pancreatitis     Alcoholism Mother      Peripheral Vascular Disease Mother         S/P aortofemoral bypass     Alcoholism Father      Lung Cancer Father          age 54     Pancreatitis Father         Alcoholic     Breast Cancer Maternal Grandmother         Early 60's      Sleep Family Hx: Patient denies any known family history of sleep apnea, restless legs syndrome, narcolepsy or other sleep disorders.          Physical Examination:   Ht 1.676 m (5' 6\")   Wt 93.4 kg (206 lb)   BMI 33.25 kg/m    General appearance: Awake, alert, cooperative. Well groomed. Sitting comfortably in chair. In no apparent distress.  HEENT: Head: Normocephalic, atraumatic. Eyes:Conjunctiva clear. Sclera normal. Nose: External appearance without deformity.   Pulmonary:  Able to speak easily in full sentences. No cough or wheeze. "   Skin:  No rashes or significant lesions on visible skin.   Neurologic: Alert, oriented x3.   Psychiatric: Mood euthymic. Affect congruent with full range and intensity.          Data: All pertinent previous laboratory data reviewed     No results found for: PH, PHARTERIAL, PO2, AW0SEBRMEXD, SAT, PCO2, HCO3, BASEEXCESS, DIANA, BEB  Lab Results   Component Value Date    TSH 1.85 06/30/2020     No results found for: GLC  No results found for: HGB  No results found for: BUN, CR  No results found for: AST, ALT, GGT, ALKPHOS, BILITOTAL, BILICONJ, BILIDIRECT, MICK  No results found for: UAMP, UBARB, BENZODIAZEUR, UCANN, UCOC, OPIT, UPCP    Copy to: Debora Matthews PA-C  May 6, 2022     [facility]  [address] [place] [address]     [facility]  [address] [address]    Chart documentation was completed, in part, with [employer]uki ([employer]) voice-recognition software. Even though reviewed, some grammatical, spelling, and word errors may remain.    57 minutes spent on day of encounter reviewing medical records, history and physical examination, review of previous testing and interpretation, documentation and further activities as noted above

## 2022-06-21 ENCOUNTER — OFFICE VISIT (OUTPATIENT)
Dept: SLEEP MEDICINE | Facility: CLINIC | Age: 37
End: 2022-06-21
Attending: PHYSICIAN ASSISTANT
Payer: COMMERCIAL

## 2022-06-21 DIAGNOSIS — R29.818 SUSPECTED SLEEP APNEA: ICD-10-CM

## 2022-06-21 DIAGNOSIS — R06.89 GASPING FOR BREATH: ICD-10-CM

## 2022-06-21 DIAGNOSIS — E66.9 OBESITY (BMI 30-39.9): ICD-10-CM

## 2022-06-21 DIAGNOSIS — G47.00 INSOMNIA, UNSPECIFIED TYPE: ICD-10-CM

## 2022-06-21 DIAGNOSIS — R06.83 HABITUAL SNORING: ICD-10-CM

## 2022-06-21 PROCEDURE — G0399 HOME SLEEP TEST/TYPE 3 PORTA: HCPCS | Performed by: INTERNAL MEDICINE

## 2022-06-22 ENCOUNTER — DOCUMENTATION ONLY (OUTPATIENT)
Dept: SLEEP MEDICINE | Facility: CLINIC | Age: 37
End: 2022-06-22
Attending: PHYSICIAN ASSISTANT

## 2022-06-23 NOTE — PROGRESS NOTES
HST POST-STUDY QUESTIONNAIRE    1. What time did you go to bed?  9:30  2. How long do you think it took to fall asleep?  45 min  3. What time did you wake up to start the day?  4:25  4. Did you get up during the night at all?  yes  5. If you woke up, do you remember approximately what time(s)? 2:00  6. Did you have any difficulty with the equipment?  No  7. Did you us any type of treatment with this study?  None  8. Was the head of the bed elevated? No  9. Did you sleep in a recliner?  No  10. Did you stop using CPAP at least 3 days before this test?  NA  11. Any other information you'd like us to know? No    This HSAT was performed using a Noxturnal T3 device which recorded snore, sound, movement activity, body position, nasal pressure, oronasal thermal airflow, pulse, oximetry and both chest and abdominal respiratory effort. HSAT data was restricted to the time patient states they were in bed.     HSAT was scored using 1B 4% hypopnea rule.     HST AHI (Non-PAT): 1.7  Snoring was reported as intermittent.  Time with SpO2 below 89% was 0 minutes.   Overall signal quality was good     Pt will follow up with sleep provider to determine appropriate therapy.

## 2022-06-24 NOTE — PROCEDURES
"HOME SLEEP STUDY INTERPRETATION        Patient: Angie López  MRN: [id number]  YOB: 1985  Study Date: 2022  PCP/Referring Provider: Debora Matthews;   Ordering Provider: Fanta Bhagat PA-C      Indications for Home Study: Angie López is a 36 year old female who presents with symptoms suggestive of obstructive sleep apnea.    Estimated body mass index is 33.25 kg/m  as calculated from the following:    Height as of 22: 1.676 m (5' 6\").    Weight as of 22: 93.4 kg (206 lb).  Total score - [place]: 7 (2022  7:52 AM)  STOP-BAN/8        Data: A full night home sleep study was performed recording the standard physiologic parameters including body position, movement, sound, nasal pressure, thermal oral airflow, chest and abdominal movements with respiratory inductance plethysmography, and oxygen saturation by pulse oximetry. Pulse rate was estimated by oximetry recording. This study was considered adequate based on > 4 hours of quality oximetry and respiratory recording. As specified by the AASM Manual for the Scoring of Sleep and Associated events, version 2.3, Rule VIII.D 1B, 4% oxygen desaturation scoring for hypopneas is used as a standard of care on all home sleep apnea testing.        Analysis Time:  384 minutes        Respiration:   Sleep Associated Hypoxemia: sustained hypoxemia was not present. Baseline oxygen saturation was 95%.  Time with saturation less than or equal to 88% was 0 minutes. The lowest oxygen saturation was 88%.   Snoring: Snoring was present.  Respiratory events: The home study revealed a presence of 0 obstructive apneas and 0 mixed and central apneas. There were 11 hypopneas resulting in a combined apnea/hypopnea index [AHI] of 1.7 events per hour.  AHI was 7.8 per hour supine, - per hour prone, 0 per hour on left side, and 0 per hour on right side.   Pattern: Excluding events noted above, respiratory rate and pattern was Normal.      Position: " Percent of time spent: supine - 21.9%, prone - 0%, on left - 24.9%, on right - 53%.      Heart Rate: By pulse oximetry normal rate was noted.       Assessment:     Negative for clinically significant sleep apnea.    Sleep associated hypoxemia was not present.    Recommendations:    If clinical concern for sleep apnea remains due to history or exam findings, consider in lab PSG for assessment.      Diagnosis Code(s): Snoring R06.83    Marcos Gay MD, June 24, 2022   Diplomate, American Board of Psychiatry and Neurology, Sleep Medicine

## 2022-07-06 ENCOUNTER — MYC MEDICAL ADVICE (OUTPATIENT)
Dept: FAMILY MEDICINE | Facility: CLINIC | Age: 37
End: 2022-07-06

## 2022-07-06 ASSESSMENT — SLEEP AND FATIGUE QUESTIONNAIRES
HOW LIKELY ARE YOU TO NOD OFF OR FALL ASLEEP WHEN YOU ARE A PASSENGER IN A CAR FOR AN HOUR WITHOUT A BREAK: SLIGHT CHANCE OF DOZING
HOW LIKELY ARE YOU TO NOD OFF OR FALL ASLEEP WHILE SITTING AND TALKING TO SOMEONE: WOULD NEVER DOZE
HOW LIKELY ARE YOU TO NOD OFF OR FALL ASLEEP IN A CAR, WHILE STOPPED FOR A FEW MINUTES IN TRAFFIC: WOULD NEVER DOZE
HOW LIKELY ARE YOU TO NOD OFF OR FALL ASLEEP WHILE SITTING QUIETLY AFTER LUNCH WITHOUT ALCOHOL: SLIGHT CHANCE OF DOZING
HOW LIKELY ARE YOU TO NOD OFF OR FALL ASLEEP WHILE WATCHING TV: MODERATE CHANCE OF DOZING
HOW LIKELY ARE YOU TO NOD OFF OR FALL ASLEEP WHILE SITTING INACTIVE IN A PUBLIC PLACE: WOULD NEVER DOZE
HOW LIKELY ARE YOU TO NOD OFF OR FALL ASLEEP WHILE SITTING AND READING: MODERATE CHANCE OF DOZING
HOW LIKELY ARE YOU TO NOD OFF OR FALL ASLEEP WHILE LYING DOWN TO REST IN THE AFTERNOON WHEN CIRCUMSTANCES PERMIT: HIGH CHANCE OF DOZING

## 2022-07-06 NOTE — TELEPHONE ENCOUNTER
Advised an appointment related to care accident.  Marjorie Christensen RN on 7/6/2022 at 3:31 PM

## 2022-07-07 NOTE — PROGRESS NOTES
[facility]   Outpatient Sleep Medicine  Jul 8, 2022       Name: Angie López MRN# [id number]   Age: 36 year old YOB: 1985          Assessment and Plan:   1. Snoring  2. Obesity (BMI 30-39.9)    During this visit, we reviewed the summary of the study including position, event distribution, oximetry and heart rate. This home sleep study was negative for clinically significant sleep apnea with AHI 1.7. Of note, the sleep apnea events seen were supine dependent. Sleep associated hypoxemia was not present.    Patient reassured no significant apnea or hypoxemia noted. Felt she slept well the night of the test, good representation of her sleep. Discussed avoidance of supine sleep and patient will work on weight loss. Follow-up with sleep medicine for now prn.        Chief Complaint      Chief Complaint   Patient presents with     sleep study follow up          History of Present Illness:   Angie López is a 36 year old female who presents to the clinic for results of recent sleep study completed on 6/21/2022. Study was completed to evaluate for sleep apnea.     Reviewed results of sleep study with patient as follows:  HOME SLEEP STUDY INTERPRETATION  Analysis Time:  384 minutes     Respiration:   Sleep Associated Hypoxemia: sustained hypoxemia was not present. Baseline oxygen saturation was 95%.  Time with saturation less than or equal to 88% was 0 minutes. The lowest oxygen saturation was 88%.   Snoring: Snoring was present.  Respiratory events: The home study revealed a presence of 0 obstructive apneas and 0 mixed and central apneas. There were 11 hypopneas resulting in a combined apnea/hypopnea index [AHI] of 1.7 events per hour.  AHI was 7.8 per hour supine, - per hour prone, 0 per hour on left side, and 0 per hour on right side.   Pattern: Excluding events noted above, respiratory rate and pattern was Normal.     Position: Percent of time spent: supine - 21.9%, prone - 0%, on  "left - 24.9%, on right - 53%.     Heart Rate: By pulse oximetry normal rate was noted.      Past medical/surgical history, family history, social history, medications and allergies were reviewed.           Physical Examination:   Ht 1.651 m (5' 5\")   Wt 91.6 kg (202 lb)   BMI 33.61 kg/m    General appearance: Awake, alert, cooperative. Well groomed. Sitting comfortably in chair. In no apparent distress.  HEENT: Head: Normocephalic, atraumatic. Eyes:Conjunctiva clear. Sclera normal. Nose: External appearance without deformity.   Pulmonary:  Able to speak easily in full sentences. No cough or wheeze.   Skin:  No rashes or significant lesions on visible skin.   Neurologic: Alert, oriented x3.   Psychiatric: Mood euthymic. Affect congruent with full range and intensity.      CC:  Debora Matthews PA-C  Jul 8, 2022     [facility]  [address] [place] [address]     [facility]  [address] [place] [address]    Chart documentation was completed, in part, with "[employer] voice-recognition software. Even though reviewed, some grammatical, spelling, and word errors may remain.    21 minutes spent on day of encounter doing chart review, history and exam, documentation, and further activities as noted above      "

## 2022-07-08 ENCOUNTER — VIRTUAL VISIT (OUTPATIENT)
Dept: SLEEP MEDICINE | Facility: CLINIC | Age: 37
End: 2022-07-08
Payer: COMMERCIAL

## 2022-07-08 VITALS — WEIGHT: 202 LBS | HEIGHT: 65 IN | BODY MASS INDEX: 33.66 KG/M2

## 2022-07-08 DIAGNOSIS — E66.9 OBESITY (BMI 30-39.9): ICD-10-CM

## 2022-07-08 DIAGNOSIS — R06.83 SNORING: Primary | ICD-10-CM

## 2022-07-08 PROCEDURE — 99213 OFFICE O/P EST LOW 20 MIN: CPT | Mod: GT | Performed by: PHYSICIAN ASSISTANT

## 2022-07-08 NOTE — PATIENT INSTRUCTIONS
"HOME SLEEP STUDY INTERPRETATION           Patient: Angie López  MRN: [id number]  YOB: 1985  Study Date: 2022  PCP/Referring Provider: Debora Matthews;   Ordering Provider: Fanta Bhagat PA-C        Indications for Home Study: Angie Lópze is a 36 year old female who presents with symptoms suggestive of obstructive sleep apnea.     Estimated body mass index is 33.25 kg/m  as calculated from the following:    Height as of 22: 1.676 m (5' 6\").    Weight as of 22: 93.4 kg (206 lb).  Total score - [place]: 7 (2022  7:52 AM)  STOP-BAN/8           Data: A full night home sleep study was performed recording the standard physiologic parameters including body position, movement, sound, nasal pressure, thermal oral airflow, chest and abdominal movements with respiratory inductance plethysmography, and oxygen saturation by pulse oximetry. Pulse rate was estimated by oximetry recording. This study was considered adequate based on > 4 hours of quality oximetry and respiratory recording. As specified by the AASM Manual for the Scoring of Sleep and Associated events, version 2.3, Rule VIII.D 1B, 4% oxygen desaturation scoring for hypopneas is used as a standard of care on all home sleep apnea testing.           Analysis Time:  384 minutes           Respiration:   Sleep Associated Hypoxemia: sustained hypoxemia was not present. Baseline oxygen saturation was 95%.  Time with saturation less than or equal to 88% was 0 minutes. The lowest oxygen saturation was 88%.   Snoring: Snoring was present.  Respiratory events: The home study revealed a presence of 0 obstructive apneas and 0 mixed and central apneas. There were 11 hypopneas resulting in a combined apnea/hypopnea index [AHI] of 1.7 events per hour.  AHI was 7.8 per hour supine, - per hour prone, 0 per hour on left side, and 0 per hour on right side.   Pattern: Excluding events noted above, respiratory rate and pattern was Normal.      "   Position: Percent of time spent: supine - 21.9%, prone - 0%, on left - 24.9%, on right - 53%.        Heart Rate: By pulse oximetry normal rate was noted.         Assessment:   Negative for clinically significant sleep apnea.  Sleep associated hypoxemia was not present.     Recommendations:  If clinical concern for sleep apnea remains due to history or exam findings, consider in lab PSG for assessment.        Diagnosis Code(s): Snoring R06.83     Marcos Gay MD, June 24, 2022   Diplomate, American Board of Psychiatry and Neurology, Sleep Medicine      There are some options for therapy for snoring:     Behavioral: Avoid alcohol within 2 to 3 hours of bedtime and treat nasal allergies with nasal steroid or antihistamine spray.  Consider long-term weight reduction strategy.     Devices:     Use sleep positioning device to maintain lateral sleep positioning: Devices that maintain sleeping on the back to prevent snoring and mild sleep apnea.  These are not covered by health insurance.   http://Arbsource.PolicyBazaar/   http://nightshifttherapy.com/     Mandibular advancement device from specialized dentist (see list following your test)-this may be covered in your case if you have medical conditions such as high blood pressure or abnormal sleepiness.  You would have to and should check with your insurance carrier and contact me for referral.     Consider training your upper airway muscles to improve snoring:   https://apps.Tower Vision.PolicyBazaar/Hundo/maryanne/soundly-reduce-snoring/ak9519327538   https://OssDsign AB/     [facility]   Search engine: https://mms.aadsm.org/members/directory/search_bootstrap.php?org_id=ADSM&    Certified in Dental Sleep Medicine     Ishmael Loyd   Degree: JACKIE   [address]   [address]   [place] [address]   Professional Phone: [phone]   Website: [email or web address]     Placido Joshi   Degree: JACKIE   [facility]   [address]   [address]    [place] [address]   Professional Phone: [phone]: [phone]     Alexsandra Chaves   [facility]   [address]   [place] [address]   Professional Phone: [phone]   Website: [email or web address].[email or web address] [email or web address] Corporation        Nate Cha   Degree: DDS   [address]   [address]   [place] [address]   Professional Phone: [phone]   Fax: [phone]     Arthur Nicole   Degree: DDS   [facility]   [address]   [place] [address]   Professional Phone: [phone]   Website: [email or web address]        Cheung Chris   [facility]   [address]   [address]   [place] [address]   Professional Phone: [phone]   Website: [email or web address] [email or web address]        Cindi Jackson   Degree: DDS   [facility]   [address]   [address]   [place] [address]   Professional Phone: [phone]      Kendra Crabtree   Degree: DDS, PhD   [facility]   [address] [place] [address] [address] [place] [address],   [address]   [place] [address]   Appointments: [phone]   Fax: [phone]   [facility]   [address]   [address]   [place] [address]   Appointments: [phone]   Fax: [phone]     Les Bonilla   Degree: DDS   [address] [place] [address]   Professional Phone: [phone]   Fax: [phone]   Website: [email or web address]        Dominic Hoang   Degree: JACKIE   [facility]   [address] [place] [address]     Mariona Mulet Pradera   Degree: DDS, formerly [facility], Oral Medicine, [facility]   [address] [place] [address]   Professional Phone: [phone]       Fe Reyes   Degree: MS JACKIE   [facility]   [address]   [address]   [place] [address]   Professional Phone: [phone]     Vesta Adams   Degree: JACKIE   [facility]   [address]   [address]   [place] [address]   [facility]     Felton Raza   Degree: JACKIE   [facility]   [address] [place] [address]   Professional Phone: [phone]   Website: [email or web address]        Cezar Cordova   Degree: JACKIE [facility]   [address] [place] [address]   Professional Phone: [phone]   Fax: [phone]        Damon Mckeon   Degree: JACKIE [facility]   [address]   [address]   [place] [address]     ACCEPT MEDICARE   Ulises Whitehaed DDS   [address], [place] [address]   [phone]; [phone] (fax)   1EQ     Tristian Almonte DDS, MS   [facility]   [address]   [address]   [place] [address]   Appointments: [phone]   Fax: [phone]       ADDITIONAL PROVIDERS   Mason Schmitz DDS   [facility]   [address]   [address]   [place] [address]   Appointments: [phone]   Fax: [phone]       Primitivo Gay DDS, [facility]   [address] [place] [address] [address]   [place] [address]   Appointments: [phone] Ext: [phone]   Fax: [phone]   [email or web address]

## 2022-07-08 NOTE — PROGRESS NOTES
Angie is a 36 year old who is being evaluated via a billable video visit.      How would you like to obtain your AVS? MyChart  If the video visit is dropped, the invitation should be resent by: Send to e-mail at: [email or web address]  Will anyone else be joining your video visit? [facility]  Laura Mirza      Video-Visit Details    Video Start Time: 9:07AM    Type of service:  Video Visit    Video End Time:9:18AM    Originating Location (pt. Location): Home    Distant Location (provider location):  [facility]     Platform used for Video Visit: Tien Bhagat PA-C

## 2022-07-23 ENCOUNTER — HEALTH MAINTENANCE LETTER (OUTPATIENT)
Age: 37
End: 2022-07-23

## 2022-09-25 ENCOUNTER — HEALTH MAINTENANCE LETTER (OUTPATIENT)
Age: 37
End: 2022-09-25

## 2024-03-02 ENCOUNTER — HEALTH MAINTENANCE LETTER (OUTPATIENT)
Age: 39
End: 2024-03-02

## 2024-09-28 ENCOUNTER — HEALTH MAINTENANCE LETTER (OUTPATIENT)
Age: 39
End: 2024-09-28

## 2025-03-15 ENCOUNTER — HEALTH MAINTENANCE LETTER (OUTPATIENT)
Age: 40
End: 2025-03-15